# Patient Record
Sex: FEMALE | Race: WHITE | HISPANIC OR LATINO | Employment: OTHER | URBAN - METROPOLITAN AREA
[De-identification: names, ages, dates, MRNs, and addresses within clinical notes are randomized per-mention and may not be internally consistent; named-entity substitution may affect disease eponyms.]

---

## 2020-01-18 ENCOUNTER — APPOINTMENT (EMERGENCY)
Dept: RADIOLOGY | Facility: HOSPITAL | Age: 70
DRG: 190 | End: 2020-01-18
Payer: COMMERCIAL

## 2020-01-18 ENCOUNTER — APPOINTMENT (INPATIENT)
Dept: ULTRASOUND IMAGING | Facility: HOSPITAL | Age: 70
DRG: 190 | End: 2020-01-18
Payer: COMMERCIAL

## 2020-01-18 ENCOUNTER — HOSPITAL ENCOUNTER (INPATIENT)
Facility: HOSPITAL | Age: 70
LOS: 6 days | Discharge: HOME | DRG: 190 | End: 2020-01-24
Attending: EMERGENCY MEDICINE | Admitting: INTERNAL MEDICINE
Payer: COMMERCIAL

## 2020-01-18 ENCOUNTER — APPOINTMENT (INPATIENT)
Dept: CT IMAGING | Facility: HOSPITAL | Age: 70
DRG: 190 | End: 2020-01-18
Payer: COMMERCIAL

## 2020-01-18 DIAGNOSIS — R79.89 ELEVATED LFTS: ICD-10-CM

## 2020-01-18 DIAGNOSIS — I10 HYPERTENSION: ICD-10-CM

## 2020-01-18 DIAGNOSIS — J10.1 INFLUENZA B: ICD-10-CM

## 2020-01-18 DIAGNOSIS — R06.00 DYSPNEA: Primary | ICD-10-CM

## 2020-01-18 DIAGNOSIS — J98.01 BRONCHOSPASM: ICD-10-CM

## 2020-01-18 DIAGNOSIS — J44.1 COPD WITH ACUTE EXACERBATION (HCC): ICD-10-CM

## 2020-01-18 DIAGNOSIS — K76.6 PORTAL HYPERTENSION (HCC): ICD-10-CM

## 2020-01-18 DIAGNOSIS — R53.1 LEFT-SIDED WEAKNESS: ICD-10-CM

## 2020-01-18 DIAGNOSIS — I63.9 CVA (CEREBRAL VASCULAR ACCIDENT) (HCC): ICD-10-CM

## 2020-01-18 DIAGNOSIS — J18.9 PNEUMONIA: ICD-10-CM

## 2020-01-18 PROBLEM — E11.9 DIABETES MELLITUS (HCC): Status: ACTIVE | Noted: 2020-01-18

## 2020-01-18 LAB
ALBUMIN SERPL BCP-MCNC: 3 G/DL (ref 3.5–5)
ALP SERPL-CCNC: 357 U/L (ref 46–116)
ALT SERPL W P-5'-P-CCNC: 98 U/L (ref 12–78)
ANION GAP SERPL CALCULATED.3IONS-SCNC: 10 MMOL/L (ref 4–13)
AST SERPL W P-5'-P-CCNC: 90 U/L (ref 5–45)
BASOPHILS # BLD AUTO: 0.02 THOUSANDS/ΜL (ref 0–0.1)
BASOPHILS NFR BLD AUTO: 0 % (ref 0–1)
BILIRUB SERPL-MCNC: 0.5 MG/DL (ref 0.2–1)
BUN SERPL-MCNC: 11 MG/DL (ref 5–25)
CALCIUM SERPL-MCNC: 8.8 MG/DL (ref 8.3–10.1)
CHLORIDE SERPL-SCNC: 104 MMOL/L (ref 100–108)
CO2 SERPL-SCNC: 26 MMOL/L (ref 21–32)
CREAT SERPL-MCNC: 0.71 MG/DL (ref 0.6–1.3)
D DIMER PPP FEU-MCNC: 0.88 UG/ML FEU
EOSINOPHIL # BLD AUTO: 0.01 THOUSAND/ΜL (ref 0–0.61)
EOSINOPHIL NFR BLD AUTO: 0 % (ref 0–6)
ERYTHROCYTE [DISTWIDTH] IN BLOOD BY AUTOMATED COUNT: 15.4 % (ref 11.6–15.1)
FLUAV RNA NPH QL NAA+PROBE: ABNORMAL
FLUBV RNA NPH QL NAA+PROBE: DETECTED
GFR SERPL CREATININE-BSD FRML MDRD: 87 ML/MIN/1.73SQ M
GLUCOSE SERPL-MCNC: 166 MG/DL (ref 65–140)
GLUCOSE SERPL-MCNC: 198 MG/DL (ref 65–140)
GLUCOSE SERPL-MCNC: 99 MG/DL (ref 65–140)
HCT VFR BLD AUTO: 37.8 % (ref 34.8–46.1)
HGB BLD-MCNC: 12.1 G/DL (ref 11.5–15.4)
IMM GRANULOCYTES # BLD AUTO: 0.03 THOUSAND/UL (ref 0–0.2)
IMM GRANULOCYTES NFR BLD AUTO: 1 % (ref 0–2)
LACTATE SERPL-SCNC: 1.4 MMOL/L (ref 0.5–2)
LYMPHOCYTES # BLD AUTO: 0.83 THOUSANDS/ΜL (ref 0.6–4.47)
LYMPHOCYTES NFR BLD AUTO: 13 % (ref 14–44)
MCH RBC QN AUTO: 23.4 PG (ref 26.8–34.3)
MCHC RBC AUTO-ENTMCNC: 32 G/DL (ref 31.4–37.4)
MCV RBC AUTO: 73 FL (ref 82–98)
MONOCYTES # BLD AUTO: 0.9 THOUSAND/ΜL (ref 0.17–1.22)
MONOCYTES NFR BLD AUTO: 15 % (ref 4–12)
NEUTROPHILS # BLD AUTO: 4.41 THOUSANDS/ΜL (ref 1.85–7.62)
NEUTS SEG NFR BLD AUTO: 71 % (ref 43–75)
NRBC BLD AUTO-RTO: 0 /100 WBCS
PLATELET # BLD AUTO: 220 THOUSANDS/UL (ref 149–390)
PLATELET # BLD AUTO: 258 THOUSANDS/UL (ref 149–390)
PMV BLD AUTO: 10 FL (ref 8.9–12.7)
PMV BLD AUTO: 9.6 FL (ref 8.9–12.7)
POTASSIUM SERPL-SCNC: 3.9 MMOL/L (ref 3.5–5.3)
PROT SERPL-MCNC: 7.2 G/DL (ref 6.4–8.2)
RBC # BLD AUTO: 5.16 MILLION/UL (ref 3.81–5.12)
RSV RNA NPH QL NAA+PROBE: ABNORMAL
SODIUM SERPL-SCNC: 140 MMOL/L (ref 136–145)
TROPONIN I SERPL-MCNC: <0.02 NG/ML
TSH SERPL DL<=0.05 MIU/L-ACNC: 0.42 UIU/ML (ref 0.36–3.74)
WBC # BLD AUTO: 6.2 THOUSAND/UL (ref 4.31–10.16)

## 2020-01-18 PROCEDURE — 85049 AUTOMATED PLATELET COUNT: CPT | Performed by: INTERNAL MEDICINE

## 2020-01-18 PROCEDURE — 87040 BLOOD CULTURE FOR BACTERIA: CPT | Performed by: EMERGENCY MEDICINE

## 2020-01-18 PROCEDURE — 94760 N-INVAS EAR/PLS OXIMETRY 1: CPT

## 2020-01-18 PROCEDURE — 85025 COMPLETE CBC W/AUTO DIFF WBC: CPT | Performed by: EMERGENCY MEDICINE

## 2020-01-18 PROCEDURE — 96361 HYDRATE IV INFUSION ADD-ON: CPT

## 2020-01-18 PROCEDURE — 82948 REAGENT STRIP/BLOOD GLUCOSE: CPT

## 2020-01-18 PROCEDURE — 85379 FIBRIN DEGRADATION QUANT: CPT | Performed by: EMERGENCY MEDICINE

## 2020-01-18 PROCEDURE — 83605 ASSAY OF LACTIC ACID: CPT | Performed by: EMERGENCY MEDICINE

## 2020-01-18 PROCEDURE — 99222 1ST HOSP IP/OBS MODERATE 55: CPT | Performed by: INTERNAL MEDICINE

## 2020-01-18 PROCEDURE — 94644 CONT INHLJ TX 1ST HOUR: CPT

## 2020-01-18 PROCEDURE — 94664 DEMO&/EVAL PT USE INHALER: CPT

## 2020-01-18 PROCEDURE — 36415 COLL VENOUS BLD VENIPUNCTURE: CPT

## 2020-01-18 PROCEDURE — 84145 PROCALCITONIN (PCT): CPT | Performed by: INTERNAL MEDICINE

## 2020-01-18 PROCEDURE — 99285 EMERGENCY DEPT VISIT HI MDM: CPT | Performed by: EMERGENCY MEDICINE

## 2020-01-18 PROCEDURE — 93005 ELECTROCARDIOGRAM TRACING: CPT

## 2020-01-18 PROCEDURE — 96374 THER/PROPH/DIAG INJ IV PUSH: CPT

## 2020-01-18 PROCEDURE — 71275 CT ANGIOGRAPHY CHEST: CPT

## 2020-01-18 PROCEDURE — 84484 ASSAY OF TROPONIN QUANT: CPT | Performed by: EMERGENCY MEDICINE

## 2020-01-18 PROCEDURE — 71046 X-RAY EXAM CHEST 2 VIEWS: CPT

## 2020-01-18 PROCEDURE — 87631 RESP VIRUS 3-5 TARGETS: CPT | Performed by: EMERGENCY MEDICINE

## 2020-01-18 PROCEDURE — 84443 ASSAY THYROID STIM HORMONE: CPT | Performed by: INTERNAL MEDICINE

## 2020-01-18 PROCEDURE — 99285 EMERGENCY DEPT VISIT HI MDM: CPT

## 2020-01-18 PROCEDURE — 80053 COMPREHEN METABOLIC PANEL: CPT | Performed by: EMERGENCY MEDICINE

## 2020-01-18 RX ORDER — ACETAMINOPHEN 325 MG/1
650 TABLET ORAL ONCE
Status: COMPLETED | OUTPATIENT
Start: 2020-01-18 | End: 2020-01-18

## 2020-01-18 RX ORDER — LISINOPRIL 10 MG/1
10 TABLET ORAL DAILY
Status: DISCONTINUED | OUTPATIENT
Start: 2020-01-19 | End: 2020-01-24 | Stop reason: HOSPADM

## 2020-01-18 RX ORDER — PREDNISONE 10 MG/1
40 TABLET ORAL DAILY
COMMUNITY
End: 2020-01-18

## 2020-01-18 RX ORDER — SODIUM CHLORIDE 9 MG/ML
50 INJECTION, SOLUTION INTRAVENOUS CONTINUOUS
Status: DISCONTINUED | OUTPATIENT
Start: 2020-01-18 | End: 2020-01-22

## 2020-01-18 RX ORDER — METHYLPREDNISOLONE SODIUM SUCCINATE 125 MG/2ML
125 INJECTION, POWDER, LYOPHILIZED, FOR SOLUTION INTRAMUSCULAR; INTRAVENOUS ONCE
Status: COMPLETED | OUTPATIENT
Start: 2020-01-18 | End: 2020-01-18

## 2020-01-18 RX ORDER — LACTULOSE 10 G/15ML
15 SOLUTION ORAL 3 TIMES DAILY PRN
COMMUNITY

## 2020-01-18 RX ORDER — ASPIRIN 81 MG/1
81 TABLET, CHEWABLE ORAL DAILY
COMMUNITY

## 2020-01-18 RX ORDER — ASPIRIN 81 MG/1
81 TABLET, CHEWABLE ORAL DAILY
Status: DISCONTINUED | OUTPATIENT
Start: 2020-01-19 | End: 2020-01-22

## 2020-01-18 RX ORDER — FOLIC ACID 1 MG/1
1 TABLET ORAL DAILY
Status: DISCONTINUED | OUTPATIENT
Start: 2020-01-19 | End: 2020-01-24 | Stop reason: HOSPADM

## 2020-01-18 RX ORDER — TEMAZEPAM 7.5 MG/1
7.5 CAPSULE ORAL
Status: DISCONTINUED | OUTPATIENT
Start: 2020-01-18 | End: 2020-01-18

## 2020-01-18 RX ORDER — BENZONATATE 100 MG/1
100 CAPSULE ORAL 3 TIMES DAILY PRN
COMMUNITY

## 2020-01-18 RX ORDER — IPRATROPIUM BROMIDE AND ALBUTEROL SULFATE 2.5; .5 MG/3ML; MG/3ML
3 SOLUTION RESPIRATORY (INHALATION)
Status: DISCONTINUED | OUTPATIENT
Start: 2020-01-18 | End: 2020-01-18

## 2020-01-18 RX ORDER — DOXYCYCLINE HYCLATE 100 MG/1
100 CAPSULE ORAL EVERY 12 HOURS SCHEDULED
Status: DISCONTINUED | OUTPATIENT
Start: 2020-01-18 | End: 2020-01-23

## 2020-01-18 RX ORDER — MONTELUKAST SODIUM 10 MG/1
10 TABLET ORAL DAILY
COMMUNITY

## 2020-01-18 RX ORDER — SODIUM CHLORIDE 9 MG/ML
125 INJECTION, SOLUTION INTRAVENOUS CONTINUOUS
Status: DISCONTINUED | OUTPATIENT
Start: 2020-01-18 | End: 2020-01-19

## 2020-01-18 RX ORDER — MONTELUKAST SODIUM 10 MG/1
10 TABLET ORAL DAILY
Status: DISCONTINUED | OUTPATIENT
Start: 2020-01-19 | End: 2020-01-24 | Stop reason: HOSPADM

## 2020-01-18 RX ORDER — BENZONATATE 100 MG/1
100 CAPSULE ORAL 3 TIMES DAILY PRN
Status: DISCONTINUED | OUTPATIENT
Start: 2020-01-18 | End: 2020-01-24 | Stop reason: HOSPADM

## 2020-01-18 RX ORDER — LISINOPRIL 10 MG/1
10 TABLET ORAL DAILY
Status: ON HOLD | COMMUNITY
End: 2020-01-24 | Stop reason: SDUPTHER

## 2020-01-18 RX ORDER — GUAIFENESIN/DEXTROMETHORPHAN 100-10MG/5
10 SYRUP ORAL EVERY 4 HOURS PRN
Status: DISCONTINUED | OUTPATIENT
Start: 2020-01-18 | End: 2020-01-24 | Stop reason: HOSPADM

## 2020-01-18 RX ORDER — ACETAMINOPHEN 325 MG/1
650 TABLET ORAL EVERY 6 HOURS PRN
Status: DISCONTINUED | OUTPATIENT
Start: 2020-01-18 | End: 2020-01-24 | Stop reason: HOSPADM

## 2020-01-18 RX ORDER — ATORVASTATIN CALCIUM 40 MG/1
40 TABLET, FILM COATED ORAL DAILY
COMMUNITY
End: 2020-01-24 | Stop reason: HOSPADM

## 2020-01-18 RX ORDER — SODIUM CHLORIDE FOR INHALATION 0.9 %
12 VIAL, NEBULIZER (ML) INHALATION ONCE
Status: COMPLETED | OUTPATIENT
Start: 2020-01-18 | End: 2020-01-18

## 2020-01-18 RX ORDER — MULTIVIT-MIN/IRON FUM/FOLIC AC 7.5 MG-4
1 TABLET ORAL DAILY
COMMUNITY

## 2020-01-18 RX ORDER — DOCUSATE SODIUM 100 MG/1
200 CAPSULE, LIQUID FILLED ORAL 2 TIMES DAILY
Status: DISCONTINUED | OUTPATIENT
Start: 2020-01-18 | End: 2020-01-24 | Stop reason: HOSPADM

## 2020-01-18 RX ORDER — ACETAMINOPHEN 325 MG/1
650 TABLET ORAL EVERY 6 HOURS PRN
COMMUNITY

## 2020-01-18 RX ORDER — METHYLPREDNISOLONE SODIUM SUCCINATE 125 MG/2ML
60 INJECTION, POWDER, LYOPHILIZED, FOR SOLUTION INTRAMUSCULAR; INTRAVENOUS EVERY 6 HOURS SCHEDULED
Status: DISCONTINUED | OUTPATIENT
Start: 2020-01-18 | End: 2020-01-22

## 2020-01-18 RX ORDER — DOCUSATE SODIUM 100 MG/1
200 CAPSULE, LIQUID FILLED ORAL 2 TIMES DAILY
COMMUNITY
Start: 2018-03-20

## 2020-01-18 RX ORDER — POLYETHYLENE GLYCOL 3350 17 G/17G
17 POWDER, FOR SOLUTION ORAL 2 TIMES DAILY
COMMUNITY

## 2020-01-18 RX ORDER — PANTOPRAZOLE SODIUM 40 MG/1
40 TABLET, DELAYED RELEASE ORAL
Status: DISCONTINUED | OUTPATIENT
Start: 2020-01-19 | End: 2020-01-24 | Stop reason: HOSPADM

## 2020-01-18 RX ORDER — POTASSIUM CHLORIDE 750 MG/1
10 TABLET, EXTENDED RELEASE ORAL 2 TIMES DAILY
COMMUNITY

## 2020-01-18 RX ORDER — POTASSIUM CHLORIDE 750 MG/1
10 TABLET, EXTENDED RELEASE ORAL 2 TIMES DAILY
Status: DISCONTINUED | OUTPATIENT
Start: 2020-01-18 | End: 2020-01-24 | Stop reason: HOSPADM

## 2020-01-18 RX ORDER — GUAIFENESIN/DEXTROMETHORPHAN 100-10MG/5
10 SYRUP ORAL ONCE
Status: COMPLETED | OUTPATIENT
Start: 2020-01-18 | End: 2020-01-18

## 2020-01-18 RX ORDER — LEVETIRACETAM 500 MG/1
1500 TABLET ORAL 2 TIMES DAILY
Status: DISCONTINUED | OUTPATIENT
Start: 2020-01-18 | End: 2020-01-24 | Stop reason: HOSPADM

## 2020-01-18 RX ORDER — FOLIC ACID 1 MG/1
1 TABLET ORAL DAILY
COMMUNITY

## 2020-01-18 RX ORDER — TORSEMIDE 10 MG/1
10 TABLET ORAL DAILY
COMMUNITY
End: 2020-01-18

## 2020-01-18 RX ORDER — LACTULOSE 20 G/30ML
10 SOLUTION ORAL DAILY
Status: DISCONTINUED | OUTPATIENT
Start: 2020-01-19 | End: 2020-01-24 | Stop reason: HOSPADM

## 2020-01-18 RX ORDER — ATORVASTATIN CALCIUM 40 MG/1
40 TABLET, FILM COATED ORAL DAILY
Status: DISCONTINUED | OUTPATIENT
Start: 2020-01-19 | End: 2020-01-20

## 2020-01-18 RX ORDER — LEVALBUTEROL 1.25 MG/.5ML
1.25 SOLUTION, CONCENTRATE RESPIRATORY (INHALATION)
Status: DISCONTINUED | OUTPATIENT
Start: 2020-01-18 | End: 2020-01-19

## 2020-01-18 RX ORDER — DOXYCYCLINE HYCLATE 100 MG/1
100 CAPSULE ORAL EVERY 12 HOURS SCHEDULED
COMMUNITY
End: 2020-01-24 | Stop reason: HOSPADM

## 2020-01-18 RX ORDER — POLYETHYLENE GLYCOL 3350 17 G/17G
17 POWDER, FOR SOLUTION ORAL 2 TIMES DAILY
Status: DISCONTINUED | OUTPATIENT
Start: 2020-01-18 | End: 2020-01-24 | Stop reason: HOSPADM

## 2020-01-18 RX ORDER — ONDANSETRON 2 MG/ML
4 INJECTION INTRAMUSCULAR; INTRAVENOUS EVERY 6 HOURS PRN
Status: DISCONTINUED | OUTPATIENT
Start: 2020-01-18 | End: 2020-01-19

## 2020-01-18 RX ORDER — LEVETIRACETAM 500 MG/1
1500 TABLET ORAL 2 TIMES DAILY
COMMUNITY

## 2020-01-18 RX ORDER — AZITHROMYCIN 250 MG/1
500 TABLET, FILM COATED ORAL EVERY 24 HOURS
Status: DISCONTINUED | OUTPATIENT
Start: 2020-01-18 | End: 2020-01-22

## 2020-01-18 RX ORDER — ZOLPIDEM TARTRATE 5 MG/1
5 TABLET ORAL
Status: COMPLETED | OUTPATIENT
Start: 2020-01-18 | End: 2020-01-19

## 2020-01-18 RX ORDER — OMEPRAZOLE 20 MG/1
20 CAPSULE, DELAYED RELEASE ORAL EVERY 12 HOURS
COMMUNITY

## 2020-01-18 RX ADMIN — POLYETHYLENE GLYCOL 3350 17 G: 17 POWDER, FOR SOLUTION ORAL at 17:37

## 2020-01-18 RX ADMIN — IPRATROPIUM BROMIDE 0.5 MG: 0.5 SOLUTION RESPIRATORY (INHALATION) at 20:00

## 2020-01-18 RX ADMIN — SODIUM CHLORIDE 1000 ML: 0.9 INJECTION, SOLUTION INTRAVENOUS at 14:41

## 2020-01-18 RX ADMIN — LEVALBUTEROL HYDROCHLORIDE 1.25 MG: 1.25 SOLUTION, CONCENTRATE RESPIRATORY (INHALATION) at 20:00

## 2020-01-18 RX ADMIN — CEFTRIAXONE SODIUM 1000 MG: 10 INJECTION, POWDER, FOR SOLUTION INTRAVENOUS at 18:52

## 2020-01-18 RX ADMIN — INSULIN LISPRO 1 UNITS: 100 INJECTION, SOLUTION INTRAVENOUS; SUBCUTANEOUS at 19:52

## 2020-01-18 RX ADMIN — ALBUTEROL SULFATE 10 MG: 2.5 SOLUTION RESPIRATORY (INHALATION) at 13:18

## 2020-01-18 RX ADMIN — SODIUM CHLORIDE 50 ML/HR: 0.9 INJECTION, SOLUTION INTRAVENOUS at 17:23

## 2020-01-18 RX ADMIN — ZOLPIDEM TARTRATE 5 MG: 5 TABLET, FILM COATED ORAL at 21:43

## 2020-01-18 RX ADMIN — GUAIFENESIN AND DEXTROMETHORPHAN 10 ML: 100; 10 SYRUP ORAL at 15:02

## 2020-01-18 RX ADMIN — LEVETIRACETAM 1500 MG: 500 TABLET, FILM COATED ORAL at 17:37

## 2020-01-18 RX ADMIN — METHYLPREDNISOLONE SODIUM SUCCINATE 60 MG: 125 INJECTION, POWDER, FOR SOLUTION INTRAMUSCULAR; INTRAVENOUS at 17:37

## 2020-01-18 RX ADMIN — IOHEXOL 100 ML: 350 INJECTION, SOLUTION INTRAVENOUS at 15:53

## 2020-01-18 RX ADMIN — METHYLPREDNISOLONE SODIUM SUCCINATE 125 MG: 125 INJECTION, POWDER, FOR SOLUTION INTRAMUSCULAR; INTRAVENOUS at 13:15

## 2020-01-18 RX ADMIN — ISODIUM CHLORIDE 12 ML: 0.03 SOLUTION RESPIRATORY (INHALATION) at 13:18

## 2020-01-18 RX ADMIN — POTASSIUM CHLORIDE 10 MEQ: 750 TABLET, EXTENDED RELEASE ORAL at 17:37

## 2020-01-18 RX ADMIN — BENZONATATE 100 MG: 100 CAPSULE ORAL at 21:45

## 2020-01-18 RX ADMIN — ACETAMINOPHEN 650 MG: 325 TABLET ORAL at 13:09

## 2020-01-18 RX ADMIN — IPRATROPIUM BROMIDE 1 MG: 0.5 SOLUTION RESPIRATORY (INHALATION) at 13:18

## 2020-01-18 RX ADMIN — AZITHROMYCIN 500 MG: 250 TABLET, FILM COATED ORAL at 16:05

## 2020-01-18 RX ADMIN — DOCUSATE SODIUM 200 MG: 100 CAPSULE, LIQUID FILLED ORAL at 17:37

## 2020-01-18 RX ADMIN — ACETAMINOPHEN 650 MG: 325 TABLET ORAL at 21:45

## 2020-01-18 RX ADMIN — SODIUM CHLORIDE 1000 ML: 0.9 INJECTION, SOLUTION INTRAVENOUS at 13:17

## 2020-01-18 RX ADMIN — DOXYCYCLINE 100 MG: 100 CAPSULE ORAL at 21:43

## 2020-01-18 NOTE — ED PROVIDER NOTES
History  Chief Complaint   Patient presents with    Shortness of Breath     Pt presents to ED with co SOB, cough, congestion, sore throat that started 2 days ago  Per family pt has stroke last month  HPI  59-year-old  female with a chief complaint of shortness of breath, cough, congestion and sore throat that started 2 days ago  Patient had a stroke in December in [place] and was in the room with a patient that had the flu and was treated with Tamiflu during rehab  However patient was recently moved up here to her family and her granddaughter had influenza B and 2 days ago patient developed symptoms  Patient has weakness on her left upper and left lower extremity due to her CVA in December  Patient complains of cough and shortness of breath  Patient is also taking prednisone and doxycycline and has not improved  Prior to Admission Medications   Prescriptions Last Dose Informant Patient Reported? Taking?    Multiple Vitamins-Minerals (MULTIVITAMIN WITH MINERALS) tablet 1/17/2020 at Unknown time  Yes Yes   Sig: Take 1 tablet by mouth daily   VITAMIN A PO 1/18/2020 at 0800  Yes Yes   Sig: Take 1 capsule by mouth daily   ZOLPIDEM TARTRATE ER PO 1/17/2020 at Unknown time  Yes Yes   Sig: Take 10 mg by mouth daily at bedtime    acetaminophen (TYLENOL) 325 mg tablet   Yes Yes   Sig: Take 650 mg by mouth every 6 (six) hours as needed for mild pain   aspirin 81 mg chewable tablet 1/18/2020 at 0800  Yes Yes   Sig: Chew 81 mg daily   atorvastatin (LIPITOR) 40 mg tablet 1/17/2020 at Unknown time  Yes Yes   Sig: Take 40 mg by mouth daily   benzonatate (TESSALON PERLES) 100 mg capsule 1/18/2020 at 0800  Yes Yes   Sig: Take 100 mg by mouth 3 (three) times a day as needed for cough   docusate sodium (COLACE) 100 mg capsule 1/17/2020 at Unknown time  Yes Yes   Sig: Take 200 mg by mouth 2 (two) times a day    doxycycline hyclate (VIBRAMYCIN) 100 mg capsule 1/18/2020 at 0800  Yes Yes   Sig: Take 100 mg by mouth every 12 (twelve) hours   folic acid (FOLVITE) 1 mg tablet 1/17/2020 at Unknown time  Yes Yes   Sig: Take 1 mg by mouth daily    guaiFENesin (ROBITUSSIN) 100 MG/5ML oral liquid 1/17/2020 at Unknown time  Yes Yes   Sig: Take 200 mg by mouth every 12 (twelve) hours   lactulose (CHRONULAC) 10 g/15 mL solution Past Week at Unknown time  Yes Yes   Sig: Take 15 mL by mouth 3 (three) times a day as needed   levETIRAcetam (KEPPRA) 500 mg tablet 1/17/2020 at Unknown time  Yes Yes   Sig: Take 1,500 mg by mouth 2 (two) times a day    lisinopril (ZESTRIL) 10 mg tablet 1/17/2020 at Unknown time  Yes Yes   Sig: Take 10 mg by mouth daily   montelukast (SINGULAIR) 10 mg tablet 1/17/2020 at Unknown time  Yes Yes   Sig: Take 10 mg by mouth daily   omeprazole (PriLOSEC) 20 mg delayed release capsule 1/17/2020 at Unknown time  Yes Yes   Sig: Take 20 mg by mouth every 12 (twelve) hours   polyethylene glycol (MIRALAX) 17 g packet 1/17/2020 at Unknown time  Yes Yes   Sig: Take 17 g by mouth 2 (two) times a day   potassium chloride (K-DUR,KLOR-CON) 10 mEq tablet 1/17/2020 at Unknown time  Yes Yes   Sig: Take 10 mEq by mouth 2 (two) times a day       Facility-Administered Medications: None       Past Medical History:   Diagnosis Date    Asthma     Cerebral infarction ([place] [address] )     Diabetes mellitus ([place] [address] )     GERD (gastroesophageal reflux disease)     Hyperlipidemia     Hypertension     Stroke ([place] [address] )     12/19    SVT (supraventricular tachycardia) ([place] [address] )        History reviewed  No pertinent surgical history  History reviewed  No pertinent family history  I have reviewed and agree with the history as documented  Social History     Tobacco Use    Smoking status: Never Smoker    Smokeless tobacco: Never Used   Substance Use Topics    Alcohol use: Never     Frequency: Never    Drug use: Not Currently        Review of Systems   Constitutional: Positive for activity change, appetite change, chills, fatigue and fever     HENT: Positive for sore throat  Negative for congestion and rhinorrhea  Eyes: Negative for discharge and visual disturbance  Respiratory: Positive for cough, shortness of breath and wheezing  Cardiovascular: Negative for chest pain and palpitations  Gastrointestinal: Negative for abdominal pain and vomiting  Endocrine: Negative for polydipsia and polyuria  Genitourinary: Negative for dysuria and hematuria  Musculoskeletal: Positive for arthralgias, gait problem and myalgias  Negative for neck stiffness  Skin: Negative for rash and wound  Neurological: Positive for weakness  Negative for dizziness and headaches  Psychiatric/Behavioral: Negative for confusion and suicidal ideas  Is  Physical Exam  Physical Exam   Constitutional: She is oriented to person, place, and time  She appears well-developed and well-nourished  59-year-old  female lying on the stretcher who appears ill with constant cough  HENT:   Head: Normocephalic and atraumatic  Mouth/Throat: Oropharynx is clear and moist    Eyes: Pupils are equal, round, and reactive to light  EOM are normal    Neck: Normal range of motion  Neck supple  Cardiovascular: Normal rate, regular rhythm and normal heart sounds  Pulmonary/Chest: Effort normal  No stridor  No respiratory distress  She has wheezes  She has no rales  She exhibits no tenderness  Patient has some shortness of breath and a cough that is constant with expiratory wheezing in all lung fields  Abdominal: Soft  Bowel sounds are normal  There is no tenderness  There is no rebound and no guarding  Musculoskeletal: Normal range of motion  Neurological: She is alert and oriented to person, place, and time  No cranial nerve deficit  She exhibits abnormal muscle tone  Coordination abnormal    Patient has left upper extremity and left lower extremity weakness on exam; she can lift her leg slightly and has a brace on her left lower extremity    She also can lift her left arm slightly  Patient has full range of motion of her right upper and lower extremity  Skin: Skin is warm and dry  There is pallor  Psychiatric: She has a normal mood and affect  Nursing note and vitals reviewed        Vital Signs  ED Triage Vitals [01/18/20 1153]   Temperature Pulse Respirations Blood Pressure SpO2   100 1 °F (37 8 °C) (!) 120 20 (!) 210/128 95 %      Temp Source Heart Rate Source Patient Position - Orthostatic VS BP Location FiO2 (%)   Oral Monitor Sitting Right arm --      Pain Score       Worst Possible Pain           Vitals:    01/18/20 1530 01/18/20 1638 01/18/20 1639 01/18/20 1738   BP: 121/90  136/64 123/62   Pulse: 103 100  95   Patient Position - Orthostatic VS: Lying Lying  Lying         Visual Acuity      ED Medications  Medications   sodium chloride 0 9 % infusion (125 mL/hr Intravenous Not Given 1/18/20 1747)   docusate sodium (COLACE) capsule 200 mg (200 mg Oral Given 1/18/20 1737)   aspirin chewable tablet 81 mg (has no administration in time range)   levETIRAcetam (KEPPRA) tablet 1,500 mg (1,500 mg Oral Given 3/56/77 3344)   folic acid (FOLVITE) tablet 1 mg (has no administration in time range)   potassium chloride (K-DUR,KLOR-CON) CR tablet 10 mEq (10 mEq Oral Given 1/18/20 1737)   atorvastatin (LIPITOR) tablet 40 mg (has no administration in time range)   montelukast (SINGULAIR) tablet 10 mg (has no administration in time range)   polyethylene glycol (MIRALAX) packet 17 g (17 g Oral Given 1/18/20 1737)   pantoprazole (PROTONIX) EC tablet 40 mg (has no administration in time range)   guaiFENesin (ROBITUSSIN) oral liquid 200 mg (200 mg Oral Not Given 1/18/20 1725)   lisinopril (ZESTRIL) tablet 10 mg (has no administration in time range)   acetaminophen (TYLENOL) tablet 650 mg (has no administration in time range)   benzonatate (TESSALON PERLES) capsule 100 mg (has no administration in time range)   doxycycline hyclate (VIBRAMYCIN) capsule 100 mg (has no administration in time range)   lactulose 20 g/30 mL oral solution 10 g (has no administration in time range)   enoxaparin (LOVENOX) subcutaneous injection 40 mg (has no administration in time range)   ondansetron (ZOFRAN) injection 4 mg (has no administration in time range)   sodium chloride 0 9 % infusion (50 mL/hr Intravenous New Bag 1/18/20 1723)   acetaminophen (TYLENOL) tablet 650 mg (has no administration in time range)   dextromethorphan-guaiFENesin (ROBITUSSIN DM)  mg/5 mL oral syrup 10 mL (has no administration in time range)   methylPREDNISolone sodium succinate (Solu-MEDROL) injection 60 mg (60 mg Intravenous Given 1/18/20 1737)   azithromycin (ZITHROMAX) tablet 500 mg (500 mg Oral Given 1/18/20 1605)   insulin lispro (HumaLOG) 100 units/mL subcutaneous injection 1-5 Units (1 Units Subcutaneous Given 1/18/20 1952)   cefTRIAXone (ROCEPHIN) 1,000 mg in dextrose 5 % 50 mL IVPB (1,000 mg Intravenous New Bag 1/18/20 1852)   zolpidem (AMBIEN) tablet 5 mg (has no administration in time range)   levalbuterol (XOPENEX) inhalation solution 1 25 mg (1 25 mg Nebulization Given 1/18/20 2000)   ipratropium (ATROVENT) 0 02 % inhalation solution 0 5 mg (0 5 mg Nebulization Given 1/18/20 2000)   acetaminophen (TYLENOL) tablet 650 mg (650 mg Oral Given 1/18/20 1309)   sodium chloride 0 9 % bolus 1,000 mL (0 mL Intravenous Stopped 1/18/20 1440)   albuterol inhalation solution 10 mg (10 mg Nebulization Given 1/18/20 1318)   ipratropium (ATROVENT) 0 02 % inhalation solution 1 mg (1 mg Nebulization Given 1/18/20 1318)   sodium chloride 0 9 % inhalation solution 12 mL (12 mL Nebulization Given 1/18/20 1318)   methylPREDNISolone sodium succinate (Solu-MEDROL) injection 125 mg (125 mg Intravenous Given 1/18/20 1315)   sodium chloride 0 9 % bolus 1,000 mL (0 mL Intravenous Stopped 1/18/20 1600)   dextromethorphan-guaiFENesin (ROBITUSSIN DM)  mg/5 mL oral syrup 10 mL (10 mL Oral Given 1/18/20 5406)   iohexol (OMNIPAQUE) 350 MG/ML injection (MULTI-DOSE) 100 mL (100 mL Intravenous Given 1/18/20 9110)       Diagnostic Studies  Results Reviewed     Procedure Component Value Units Date/Time    Hemoglobin A1c w/EAG Estimation (Orders if not completed within the last 90 days) [[id number]]     Lab Status:  No result Specimen:  Blood     D-dimer, quantitative [[id number]]  (Abnormal) Collected:  01/18/20 1314    Lab Status:  Final result Specimen:  Blood from Arm, Right Updated:  01/18/20 1443     D-Dimer, Quant 0 88 ug/ml FEU     Influenza A/B and RSV PCR [[id number]]  (Abnormal) Collected:  01/18/20 1216    Lab Status:  Final result Specimen:  Nasopharyngeal Swab Updated:  01/18/20 1326     INFLUENZA A PCR None Detected     INFLUENZA B PCR Detected     RSV PCR None Detected    Lactic acid x2 [[id number]]  (Normal) Collected:  01/18/20 1241    Lab Status:  Final result Specimen:  Blood from Arm, Right Updated:  01/18/20 1323     LACTIC ACID 1 4 mmol/L     Narrative:       Result may be elevated if tourniquet was used during collection      Troponin I [[id number]]  (Normal) Collected:  01/18/20 1241    Lab Status:  Final result Specimen:  Blood from Arm, Right Updated:  01/18/20 1323     Troponin I <0 02 ng/mL     Comprehensive metabolic panel [[id number]]  (Abnormal) Collected:  01/18/20 1241    Lab Status:  Final result Specimen:  Blood from Arm, Right Updated:  01/18/20 1321     Sodium 140 mmol/L      Potassium 3 9 mmol/L      Chloride 104 mmol/L      CO2 26 mmol/L      ANION GAP 10 mmol/L      BUN 11 mg/dL      Creatinine 0 71 mg/dL      Glucose 99 mg/dL      Calcium 8 8 mg/dL      AST 90 U/L      ALT 98 U/L      Alkaline Phosphatase 357 U/L      Total Protein 7 2 g/dL      Albumin 3 0 g/dL      Total Bilirubin 0 50 mg/dL      eGFR 87 ml/min/1 73sq m     Narrative:       [place] guidelines for Chronic Kidney Disease (CKD):     Stage 1 with normal or high GFR (GFR > 90 mL/min/1 73 square meters)    Stage 2 Mild CKD (GFR = 60-89 mL/min/1 73 square meters)    Stage 3A Moderate CKD (GFR = 45-59 mL/min/1 73 square meters)    Stage 3B Moderate CKD (GFR = 30-44 mL/min/1 73 square meters)    Stage 4 Severe CKD (GFR = 15-29 mL/min/1 73 square meters)    Stage 5 End Stage CKD (GFR <15 mL/min/1 73 square meters)  Note: GFR calculation is accurate only with a steady state creatinine    Blood culture #1 [[id number]] Collected:  01/18/20 1257    Lab Status: In process Specimen:  Blood from Line, Venous Updated:  01/18/20 1316    CBC and differential [[id number]]  (Abnormal) Collected:  01/18/20 1241    Lab Status:  Final result Specimen:  Blood from Arm, Right Updated:  01/18/20 1304     WBC 6 20 Thousand/uL      RBC 5 16 Million/uL      Hemoglobin 12 1 g/dL      Hematocrit 37 8 %      MCV 73 fL      MCH 23 4 pg      MCHC 32 0 g/dL      RDW 15 4 %      MPV 10 0 fL      Platelets 208 Thousands/uL      nRBC 0 /100 WBCs      Neutrophils Relative 71 %      Immat GRANS % 1 %      Lymphocytes Relative 13 %      Monocytes Relative 15 %      Eosinophils Relative 0 %      Basophils Relative 0 %      Neutrophils Absolute 4 41 Thousands/µL      Immature Grans Absolute 0 03 Thousand/uL      Lymphocytes Absolute 0 83 Thousands/µL      Monocytes Absolute 0 90 Thousand/µL      Eosinophils Absolute 0 01 Thousand/µL      Basophils Absolute 0 02 Thousands/µL     Blood culture #2 [[id number]] Collected:  01/18/20 1241    Lab Status: In process Specimen:  Blood from Arm, Right Updated:  01/18/20 1300                 CTA ED chest PE Study   Final Result by Dionicio Huber MD (01/18 1616)      No pulmonary embolus  Patchy consolidation in the dependent portion of the right upper lobe compatible with pneumonia  The dependent distribution raises the possibility of aspiration  The study was marked in [facility] for immediate notification                    Workstation performed: [id number]         XR chest 2 views   Final Result by Dionicio Huber MD (01/18 1618)      Right upper lobe pneumonia on subsequent CT is not conspicuous on radiograph  Workstation performed: Luis Chi right upper quadrant with liver dopplers    (Results Pending)              Procedures  ECG 12 Lead Documentation Only  Date/Time: 1/18/2020 1:08 PM  Performed by: Min Small DO  Authorized by: Min Small DO     ECG reviewed by me, the ED Provider: yes    Patient location:  ED  Interpretation:     Interpretation: normal    Rate:     ECG rate assessment: normal    Rhythm:     Rhythm: sinus rhythm    ST segments:     ST segments:  Normal             ED Course      patient looked a little better after her IV fluids, steroids and nebulizers, however she still looked pretty rather ill  I ordered a CT scan which showed a possible right upper lobe aspiration pneumonia  Identification of Seniors at Risk      Most Recent Value   (ISAR) Identification of Seniors at Risk   Before the illness or injury that brought you to the Emergency, did you need someone to help you on a regular basis? 0 Filed at: 01/18/2020 1159   In the last 24 hours, have you needed more help than usual?  0 Filed at: 01/18/2020 1159   Have you been hospitalized for one or more nights during the past 6 months? 1 Filed at: 01/18/2020 1159   In general, do you see well? 1 Filed at: 01/18/2020 1159   In general, do you have serious problems with your memory? 0 Filed at: 01/18/2020 1159   Do you take more than three different medications every day? 1 Filed at: 01/18/2020 1159   ISAR Score  3 Filed at: 01/18/2020 1159                          [facility]   Differential diagnosis includes:  1  Cough  2  Fever  3  Rule out pneumonia  4   Rule out influenza        Disposition  Final diagnoses:   Dyspnea   Influenza B   Bronchospasm   CVA (cerebral vascular accident) ([facility] [address] ) - in December   Left-sided weakness   Pneumonia - RUL/possible aspiration     Time reflects when diagnosis was documented in both MDM as applicable and the Disposition within this note     Time User Action Codes Description Comment    1/18/2020  2:40 PM David  Add [R06 00] Dyspnea     1/18/2020  2:42 PM Ahmet CANCINO Add [J10 1] Influenza B     1/18/2020  2:42 PM David  Add [J98 01] Bronchospasm     1/18/2020  2:44 PM David  Add [I63 9] CVA (cerebral vascular accident) ([address] )     1/18/2020  2:44 PM David  Modify [I63 9] CVA (cerebral vascular accident) [facility]) in December 1/18/2020  2:44 PM Ahmet CANCINO Add [R53 1] Left-sided weakness     1/18/2020  8:50 PM David  Add [J18 9] Pneumonia     1/18/2020  8:50 PM David  Modify [J18 9] Pneumonia RUL    1/18/2020  8:50 PM David  Modify [J18 9] Pneumonia RUL/possible aspiration      ED Disposition     ED Disposition Condition Date/Time Comment    Admit Stable Sat Jan 18, 2020  2:40 PM Case was discussed with Dr Janet Langley and the patient's admission status was agreed to be Admission Status: inpatient status to the service of Dr Janet Langley           Follow-up Information    None         Current Discharge Medication List      CONTINUE these medications which have NOT CHANGED    Details   acetaminophen (TYLENOL) 325 mg tablet Take 650 mg by mouth every 6 (six) hours as needed for mild pain      aspirin 81 mg chewable tablet Chew 81 mg daily      atorvastatin (LIPITOR) 40 mg tablet Take 40 mg by mouth daily      benzonatate (TESSALON PERLES) 100 mg capsule Take 100 mg by mouth 3 (three) times a day as needed for cough      docusate sodium (COLACE) 100 mg capsule Take 200 mg by mouth 2 (two) times a day       doxycycline hyclate (VIBRAMYCIN) 100 mg capsule Take 100 mg by mouth every 12 (twelve) hours      folic acid (FOLVITE) 1 mg tablet Take 1 mg by mouth daily       guaiFENesin (ROBITUSSIN) 100 MG/5ML oral liquid Take 200 mg by mouth every 12 (twelve) hours      lactulose (CHRONULAC) 10 g/15 mL solution Take 15 mL by mouth 3 (three) times a day as needed levETIRAcetam (KEPPRA) 500 mg tablet Take 1,500 mg by mouth 2 (two) times a day       lisinopril (ZESTRIL) 10 mg tablet Take 10 mg by mouth daily      montelukast (SINGULAIR) 10 mg tablet Take 10 mg by mouth daily      Multiple Vitamins-Minerals (MULTIVITAMIN WITH MINERALS) tablet Take 1 tablet by mouth daily      omeprazole (PriLOSEC) 20 mg delayed release capsule Take 20 mg by mouth every 12 (twelve) hours      polyethylene glycol (MIRALAX) 17 g packet Take 17 g by mouth 2 (two) times a day      potassium chloride (K-DUR,KLOR-CON) 10 mEq tablet Take 10 mEq by mouth 2 (two) times a day       VITAMIN A PO Take 1 capsule by mouth daily      ZOLPIDEM TARTRATE ER PO Take 10 mg by mouth daily at bedtime            No discharge procedures on file      ED Provider  Electronically Signed by           Albert Mcgowan DO  01/18/20 2054

## 2020-01-18 NOTE — RESPIRATORY THERAPY NOTE
RT Protocol Note  Jony Gallardo 71 y o  female MRN: [id number]  Unit/Bed#: -01 Encounter: [id number]    Assessment    Principal Problem:    COPD exacerbation ([place] [address] )  Active Problems:    CVA (cerebral vascular accident) ([place] [address] )    Hypertension    Influenza A    Diabetes mellitus ([place] [address] )    Elevated LFTs      Home Pulmonary Medications:  None       Past Medical History:   Diagnosis Date    Asthma     Cerebral infarction ([place] [address] )     Diabetes mellitus ([place] [address] )     GERD (gastroesophageal reflux disease)     Hyperlipidemia     Hypertension     Stroke ([place] [address] )     12/19    [facility]      Social History     Socioeconomic History    Marital status:      Spouse name: None    Number of children: None    Years of education: None    Highest education level: None   Occupational History    None   Social Needs    Financial resource strain: None    Food insecurity:     Worry: None     Inability: None    Transportation needs:     Medical: None     Non-medical: None   Tobacco Use    Smoking status: Never Smoker    Smokeless tobacco: Never Used   Substance and Sexual Activity    Alcohol use: Never     Frequency: Never    Drug use: Not Currently    Sexual activity: Not Currently   Lifestyle    Physical activity:     Days per week: None     Minutes per session: None    Stress: None   Relationships    Social connections:     Talks on phone: None     Gets together: None     Attends [employer] service: None     Active member of club or organization: None     Attends meetings of clubs or organizations: None     Relationship status: None    Intimate partner violence:     Fear of current or ex partner: None     Emotionally abused: None     Physically abused: None     Forced sexual activity: None   Other Topics Concern    None   Social History Narrative    None       Subjective         Objective    Physical Exam:   Assessment Type: During-treatment  General Appearance: Alert, Awake  Respiratory Pattern: Dyspnea with exertion, Dyspnea at rest, Symmetrical, Spontaneous  Chest Assessment: Chest expansion symmetrical, Trachea midline  Bilateral Breath Sounds: Expiratory wheezes, Coarse  Cough: Non-productive  O2 Device: Tx at this time  Vitals:  Blood pressure 123/62, pulse 95, temperature 98 4 °F (36 9 °C), resp  rate 18, height 5' 5" (1 651 m), weight 76 7 kg (169 lb 1 5 oz), SpO2 96 %  Imaging and other studies: I have personally reviewed pertinent reports  O2 Device: Tx at this time  Plan    Respiratory Plan: Mild Distress pathway        Resp Comments: pt given hour long tx in ED at this time  pt has some SOB but not in distress at this time  On room air   Resp protocol eval

## 2020-01-18 NOTE — PLAN OF CARE
Problem: Potential for Falls  Goal: Patient will remain free of falls  Description  INTERVENTIONS:  - Assess patient frequently for physical needs  -  Identify cognitive and physical deficits and behaviors that affect risk of falls    -  [place] fall precautions as indicated by assessment   - Educate patient/family on patient safety including physical limitations  - Instruct patient to call for assistance with activity based on assessment  - Modify environment to reduce risk of injury  - Consider OT/PT consult to assist with strengthening/mobility  1/18/2020 1713 by Lucilla Cooks, RN  Outcome: Progressing  1/18/2020 1712 by Lucilla Cooks, RN  Outcome: Progressing     Problem: PAIN - ADULT  Goal: Verbalizes/displays adequate comfort level or baseline comfort level  Description  Interventions:  - Encourage patient to monitor pain and request assistance  - Assess pain using appropriate pain scale  - Administer analgesics based on type and severity of pain and evaluate response  - Implement non-pharmacological measures as appropriate and evaluate response  - Consider cultural and social influences on pain and pain management  - Notify physician/advanced practitioner if interventions unsuccessful or patient reports new pain  1/18/2020 1713 by Lucilla Cooks, RN  Outcome: Progressing  1/18/2020 1712 by Lucilla Cooks, RN  Outcome: Progressing     Problem: INFECTION - ADULT  Goal: Absence or prevention of progression during hospitalization  Description  INTERVENTIONS:  - Assess and monitor for signs and symptoms of infection  - Monitor lab/diagnostic results  - Monitor all insertion sites, i e  indwelling lines, tubes, and drains  - Monitor endotracheal if appropriate and nasal secretions for changes in amount and color  - [place] appropriate cooling/warming therapies per order  - Administer medications as ordered  - Instruct and encourage patient and family to use good hand hygiene technique  - Identify and instruct in appropriate isolation precautions for identified infection/condition  1/18/2020 1713 by Conception Pod RN  Outcome: Progressing  1/18/2020 1712 by Conception Pod, RN  Outcome: Progressing  Goal: Absence of fever/infection during neutropenic period  Description  INTERVENTIONS:  - Monitor WBC    1/18/2020 1713 by Conception Pod, RN  Outcome: Progressing  1/18/2020 1712 by Conception Pod, RN  Outcome: Progressing     Problem: SAFETY ADULT  Goal: Maintain or return to baseline ADL function  Description  INTERVENTIONS:  -  Assess patient's ability to carry out ADLs; assess patient's baseline for ADL function and identify physical deficits which impact ability to perform ADLs (bathing, care of mouth/teeth, toileting, grooming, dressing, etc )  - Assess/evaluate cause of self-care deficits   - Assess range of motion  - Assess patient's mobility; develop plan if impaired  - Assess patient's need for assistive devices and provide as appropriate  - Encourage maximum independence but intervene and supervise when necessary  - Involve family in performance of ADLs  - Assess for home care needs following discharge   - Consider OT consult to assist with ADL evaluation and planning for discharge  - Provide patient education as appropriate  1/18/2020 1713 by Conception Pod RN  Outcome: Progressing  1/18/2020 1712 by Conception Pod RN  Outcome: Progressing  Goal: Maintain or return mobility status to optimal level  Description  INTERVENTIONS:  - Assess patient's baseline mobility status (ambulation, transfers, stairs, etc )    - Identify cognitive and physical deficits and behaviors that affect mobility  - Identify mobility aids required to assist with transfers and/or ambulation (gait belt, sit-to-stand, lift, walker, cane, etc )  - [place] fall precautions as indicated by assessment  - Record patient progress and toleration of activity level on Mobility SBAR; progress patient to next Phase/Stage  - Instruct patient to call for assistance with activity based on assessment  - Consider rehabilitation consult to assist with strengthening/weightbearing, etc   1/18/2020 1713 by Panda Eid RN  Outcome: Progressing  1/18/2020 1712 by Panda Eid RN  Outcome: Progressing     Problem: DISCHARGE PLANNING  Goal: Discharge to home or other facility with appropriate resources  Description  INTERVENTIONS:  - Identify barriers to discharge w/patient and caregiver  - Arrange for needed discharge resources and transportation as appropriate  - Identify discharge learning needs (meds, wound care, etc )  - Arrange for interpretive services to assist at discharge as needed  - Refer to Case Management Department for coordinating discharge planning if the patient needs post-hospital services based on physician/advanced practitioner order or complex needs related to functional status, cognitive ability, or social support system  1/18/2020 1713 by Panda Eid RN  Outcome: Progressing  1/18/2020 1712 by Panda Eid RN  Outcome: Progressing     Problem: Knowledge Deficit  Goal: Patient/family/caregiver demonstrates understanding of disease process, treatment plan, medications, and discharge instructions  Description  Complete learning assessment and assess knowledge base    Interventions:  - Provide teaching at level of understanding  - Provide teaching via preferred learning methods  1/18/2020 1713 by Panda Eid RN  Outcome: Progressing  1/18/2020 1712 by Panda Eid RN  Outcome: Progressing

## 2020-01-18 NOTE — ED NOTES
1  CC - cough/flu-like symptoms  2  Admission related to injury? - no  3  Orientation status - a/o x's 3  4  Abnormal labs/abnormal focused assessment/vitals - s/p CVA, left-sided deficits; + Influenza B  5  Medication/drips - NSS 1000 mL in right FA  6  Last time narcotics given - n/a  7  IV lines/drains/etc - 20 gauge right FA & right chest port-a-cath  8  Isolation status - DROPLET  9  Skin - intact  10  Ambulation -?assist x's 1   11  ED nurse's name and phone number - Dru Berman ext  [phone] [facility], RN  14  01/18/20 4801  15  PATIENT TAKES MEDICATIONS WITH APPLE SAUCE    CANNOT USE STRAWS FOR DRINKING     Ashton Clipper, RN  01/18/20 2557

## 2020-01-18 NOTE — H&P
H&P- Maximo [facility] 1950, 71 y o  female MRN: [id number]    Unit/Bed#: ED 08 Encounter: [id number]    Primary Care Provider: No primary care provider on file  Date and time admitted to hospital: 1/18/2020 12:00 PM        * COPD exacerbation (HCC)  Assessment & Plan  Known history of COPD  Diagnosed by physical exam excessive wheezing/primarily expiratory  IV Solu-Medrol   Respiratory protocol  Supplemental oxygenation only if oxygen drops to less than 88%  [place]  Early ambulation  Follow-up on CTA chest resolved    Influenza A  Assessment & Plan  Will initiate time of fluid  Reports approximately 10 days ago she was exposed to influenza and was treated with Tamiflu  No diagnosis was done at that time  Given positive serology current symptoms will re-initiate Tamiflu therapy  Hypertension  Assessment & Plan  Continue home medication  BP normotensive currently    CVA (cerebral vascular accident) [facility])  Assessment & Plan  Diagnosed with large ischemic CVA December 17  Underwent rehab with large residual on the left side complete hemiparesis  Continue PT/OT therapy  High-intensity statin therapy continue  Continue adequate BP control          VTE Prophylaxis: Enoxaparin (Lovenox)  / sequential compression device   Code Status:  Full code  POLST: There is no POLST form on file for this patient (pre-hospital)    Anticipated Length of Stay:  Patient will be admitted on an Inpatient basis with an anticipated length of stay of  greater 2 midnights  Justification for Hospital Stay:  COPD exacerbation    Total Time for Visit, including Counseling / Coordination of Care: 30 minutes  Greater than 50% of this total time spent on direct patient counseling and coordination of care      Chief Complaint:   Cough not feeling well x3 days     History of Present Illness:  Very pleasant 51-year-old woman with past medical history of asthma/emphysema, hypertension, hyperlipidemia, diabetes mellitus recent CVA with left hemiparesis diagnosis in December 2019 presenting to us with chief complaint of ongoing cough runny nose generalized malaise and fatigue  She reports symptoms started approximately 3 days ago  Reports 1 episode of fever but was not able to measure temperature failed or denies chest pain denies syncope denies shortness of breath  Chest x-ray was unremarkable emergency department however patient was found to be in mild respiratory distress with continued cough and shortness of breath and was unable to give appropriate answers during interview without getting short of breath and coughing  Other than that denies fever chills denies changes in bowel or urinary habits  Pertinent positives included wheezing throughout all lung fields tachypnea positive D-dimer CTA chest pending, unremarkable chest x-ray  Review of Systems:    Review of Systems    REVIEW OF SYSTEMS:   CONSTITUTIONAL: No weight loss, fever, chills, weakness or fatigue  HEENT: Eyes: No visual loss, blurred vision, double vision or yellow sclerae  Ears, Nose, Throat: No hearing loss, sneezing, congestion, runny nose or sore throat  SKIN: No rash or itching  CARDIOVASCULAR: No chest pain, chest pressure or chest discomfort  No palpitations or edema  RESPIRATORY:  Endorses exertional shortness of breath, cough occasional sputum  GASTROINTESTINAL: No anorexia, nausea, vomiting or diarrhea  No abdominal pain or blood  GENITOURINARY:  No Burning on urination  No issues reported    NEUROLOGICAL: No headache, dizziness, syncope, paralysis, ataxia, numbness or tingling in the extremities  No change in bowel or bladder control  MUSCULOSKELETAL: No muscle, back pain, joint pain or stiffness  HEMATOLOGIC: No anemia, bleeding or bruising  LYMPHATICS: No enlarged nodes  No history of splenectomy  PSYCHIATRIC: No history of depression or anxiety  ENDOCRINOLOGIC: No reports of sweating, cold or heat intolerance   No polyuria or polydipsia  ALLERGIES: No history of asthma, hives, eczema or rhinitis  Past Medical and Surgical History:     Past Medical History:   Diagnosis Date    Asthma     Cerebral infarction ([place] [address] )     Diabetes mellitus ([place] [address] )     GERD (gastroesophageal reflux disease)     Hyperlipidemia     Hypertension     Stroke ([place] [address] )     12/19    SVT (supraventricular tachycardia) ([place] [address] )        History reviewed  No pertinent surgical history  Meds/Allergies:    Prior to Admission medications    Medication Sig Start Date End Date Taking?  Authorizing Provider   acetaminophen (TYLENOL) 325 mg tablet Take 650 mg by mouth every 6 (six) hours as needed for mild pain   Yes Historical Provider, MD   aspirin 81 mg chewable tablet Chew 81 mg daily   Yes Historical Provider, MD   atorvastatin (LIPITOR) 40 mg tablet Take 40 mg by mouth daily   Yes Historical Provider, MD   benzonatate (TESSALON PERLES) 100 mg capsule Take 100 mg by mouth 3 (three) times a day as needed for cough   Yes Historical Provider, MD   docusate sodium (COLACE) 100 mg capsule Take 200 mg by mouth 2 (two) times a day  3/20/18  Yes Historical Provider, MD   doxycycline hyclate (VIBRAMYCIN) 100 mg capsule Take 100 mg by mouth every 12 (twelve) hours   Yes Historical Provider, MD   folic acid (FOLVITE) 1 mg tablet Take 1 mg by mouth daily    Yes Historical Provider, MD   guaiFENesin (ROBITUSSIN) 100 MG/5ML oral liquid Take 200 mg by mouth every 12 (twelve) hours   Yes Historical Provider, MD   lactulose (CHRONULAC) 10 g/15 mL solution Take 15 mL by mouth 3 (three) times a day as needed   Yes Historical Provider, MD   levETIRAcetam (KEPPRA) 500 mg tablet Take 1,500 mg by mouth 2 (two) times a day    Yes Historical Provider, MD   lisinopril (ZESTRIL) 10 mg tablet Take 10 mg by mouth daily   Yes Historical Provider, MD   montelukast (SINGULAIR) 10 mg tablet Take 10 mg by mouth daily   Yes Historical Provider, MD   Multiple Vitamins-Minerals (MULTIVITAMIN WITH MINERALS) tablet Take 1 tablet by mouth daily   Yes Historical Provider, MD   omeprazole (PriLOSEC) 20 mg delayed release capsule Take 20 mg by mouth every 12 (twelve) hours   Yes Historical Provider, MD   polyethylene glycol (MIRALAX) 17 g packet Take 17 g by mouth 2 (two) times a day   Yes Historical Provider, MD   potassium chloride (K-DUR,KLOR-CON) 10 mEq tablet Take 10 mEq by mouth 2 (two) times a day    Yes Historical Provider, MD   VITAMIN A PO Take 1 capsule by mouth daily   Yes Historical Provider, MD   ZOLPIDEM TARTRATE ER PO Take 10 mg by mouth daily at bedtime    Yes Historical Provider, MD   predniSONE 10 mg tablet Take 40 mg by mouth daily  1/18/20 Yes Historical Provider, MD   torsemide (DEMADEX) 10 mg tablet Take 10 mg by mouth daily  1/18/20 Yes Historical Provider, MD     I have reviewed home medications with patient personally  Allergies: Allergies   Allergen Reactions    Ciprofloxacin Rash    Gabapentin Rash    Levaquin [Levofloxacin] Rash    Sulfa Antibiotics Rash       Social History:     Marital Status:      Social History     Substance and Sexual Activity   Alcohol Use Not Currently     Social History     Tobacco Use   Smoking Status Never Smoker   Smokeless Tobacco Never Used     Social History     Substance and Sexual Activity   Drug Use Not Currently       Family History:    non-contributory    Physical Exam:     Vitals:   Blood Pressure: 156/77 (01/18/20 1430)  Pulse: (!) 110 (01/18/20 1430)  Temperature: 98 4 °F (36 9 °C) (01/18/20 1500)  Temp Source: Oral (01/18/20 1500)  Respirations: 20 (01/18/20 1430)  Height: 5' 5" (165 1 cm) (01/18/20 1153)  Weight - Scale: 76 7 kg (169 lb 1 5 oz) (01/18/20 1153)  SpO2: 100 % (01/18/20 1430)    Physical Exam      Constitutional:  Well developed, well nourished, no acute distress, non-toxic appearance   Eyes:  PERRL, conjunctiva normal   HENT:  Atraumatic, external ears normal, nose normal, oropharynx moist, no pharyngeal exudates  Neck- normal range of motion, no tenderness, supple   Respiratory:  Prolonged expiratory phase, rales in bilateral lower lobes, wheezing throughout all lung fields primarily expiratory wheezing  Cardiovascular:  Normal rate, normal rhythm, no murmurs, no gallops, no rubs   GI:  Soft, nondistended, normal bowel sounds, nontender, no organomegaly, no mass, no rebound, no guarding   :  No costovertebral angle tenderness   Musculoskeletal:  No edema, no tenderness, no deformities  Back- no tenderness  Integument:  Well hydrated, no rash   Lymphatic:  No lymphadenopathy noted   Neurologic:  Established residual weakness with left hemiparesis however that Alert & oriented x 3, CN 2-12 normal, normal motor function, normal sensory function, no focal deficits noted   Psychiatric:  Speech and behavior appropriate               Additional Data:     Lab Results: I have personally reviewed pertinent reports  Results from last 7 days   Lab Units 01/18/20  1241   WBC Thousand/uL 6 20   HEMOGLOBIN g/dL 12 1   HEMATOCRIT % 37 8   PLATELETS Thousands/uL 258   NEUTROS PCT % 71   LYMPHS PCT % 13*   MONOS PCT % 15*   EOS PCT % 0     Results from last 7 days   Lab Units 01/18/20  1241   POTASSIUM mmol/L 3 9   CHLORIDE mmol/L 104   CO2 mmol/L 26   BUN mg/dL 11   CREATININE mg/dL 0 71   CALCIUM mg/dL 8 8   ALK PHOS U/L 357*   ALT U/L 98*   AST U/L 90*           Imaging: I have personally reviewed pertinent reports  No results found  EKG, Pathology, and Other Studies Reviewed on Admission:   · EKG:  Noncontributory no ST elevation or T-wave inversion contiguous leads  Epic / Care Everywhere Records Reviewed: No     ** Please Note: This note has been constructed using a voice recognition system   **

## 2020-01-18 NOTE — PLAN OF CARE
Problem: Potential for Falls  Goal: Patient will remain free of falls  Description  INTERVENTIONS:  - Assess patient frequently for physical needs  -  Identify cognitive and physical deficits and behaviors that affect risk of falls    -  [place] fall precautions as indicated by assessment   - Educate patient/family on patient safety including physical limitations  - Instruct patient to call for assistance with activity based on assessment  - Modify environment to reduce risk of injury  - Consider OT/PT consult to assist with strengthening/mobility  Outcome: Progressing     Problem: PAIN - ADULT  Goal: Verbalizes/displays adequate comfort level or baseline comfort level  Description  Interventions:  - Encourage patient to monitor pain and request assistance  - Assess pain using appropriate pain scale  - Administer analgesics based on type and severity of pain and evaluate response  - Implement non-pharmacological measures as appropriate and evaluate response  - Consider cultural and social influences on pain and pain management  - Notify physician/advanced practitioner if interventions unsuccessful or patient reports new pain  Outcome: Progressing     Problem: INFECTION - ADULT  Goal: Absence or prevention of progression during hospitalization  Description  INTERVENTIONS:  - Assess and monitor for signs and symptoms of infection  - Monitor lab/diagnostic results  - Monitor all insertion sites, i e  indwelling lines, tubes, and drains  - Monitor endotracheal if appropriate and nasal secretions for changes in amount and color  - [place] appropriate cooling/warming therapies per order  - Administer medications as ordered  - Instruct and encourage patient and family to use good hand hygiene technique  - Identify and instruct in appropriate isolation precautions for identified infection/condition  Outcome: Progressing  Goal: Absence of fever/infection during neutropenic period  Description  INTERVENTIONS:  - Monitor WBC    Outcome: Progressing     Problem: SAFETY ADULT  Goal: Maintain or return to baseline ADL function  Description  INTERVENTIONS:  -  Assess patient's ability to carry out ADLs; assess patient's baseline for ADL function and identify physical deficits which impact ability to perform ADLs (bathing, care of mouth/teeth, toileting, grooming, dressing, etc )  - Assess/evaluate cause of self-care deficits   - Assess range of motion  - Assess patient's mobility; develop plan if impaired  - Assess patient's need for assistive devices and provide as appropriate  - Encourage maximum independence but intervene and supervise when necessary  - Involve family in performance of ADLs  - Assess for home care needs following discharge   - Consider OT consult to assist with ADL evaluation and planning for discharge  - Provide patient education as appropriate  Outcome: Progressing  Goal: Maintain or return mobility status to optimal level  Description  INTERVENTIONS:  - Assess patient's baseline mobility status (ambulation, transfers, stairs, etc )    - Identify cognitive and physical deficits and behaviors that affect mobility  - Identify mobility aids required to assist with transfers and/or ambulation (gait belt, sit-to-stand, lift, walker, cane, etc )  - [place] fall precautions as indicated by assessment  - Record patient progress and toleration of activity level on Mobility SBAR; progress patient to next Phase/Stage  - Instruct patient to call for assistance with activity based on assessment  - Consider rehabilitation consult to assist with strengthening/weightbearing, etc   Outcome: Progressing     Problem: DISCHARGE PLANNING  Goal: Discharge to home or other facility with appropriate resources  Description  INTERVENTIONS:  - Identify barriers to discharge w/patient and caregiver  - Arrange for needed discharge resources and transportation as appropriate  - Identify discharge learning needs (meds, wound care, etc )  - Arrange for interpretive services to assist at discharge as needed  - Refer to Case Management Department for coordinating discharge planning if the patient needs post-hospital services based on physician/advanced practitioner order or complex needs related to functional status, cognitive ability, or social support system  Outcome: Progressing     Problem: Knowledge Deficit  Goal: Patient/family/caregiver demonstrates understanding of disease process, treatment plan, medications, and discharge instructions  Description  Complete learning assessment and assess knowledge base    Interventions:  - Provide teaching at level of understanding  - Provide teaching via preferred learning methods  Outcome: Progressing

## 2020-01-18 NOTE — ASSESSMENT & PLAN NOTE
Diagnosed with large ischemic CVA December 17  Underwent rehab with large residual on the left side complete hemiparesis  Continue PT/OT therapy  High-intensity statin therapy continue  Continue adequate BP control

## 2020-01-18 NOTE — ASSESSMENT & PLAN NOTE
Will initiate time of fluid  Reports approximately 10 days ago she was exposed to influenza and was treated with Tamiflu  No diagnosis was done at that time  Given positive serology current symptoms will re-initiate Tamiflu therapy

## 2020-01-18 NOTE — ASSESSMENT & PLAN NOTE
Known history of COPD  Diagnosed by physical exam excessive wheezing/primarily expiratory  IV Solu-Medrol   Respiratory protocol  Supplemental oxygenation only if oxygen drops to less than 88%  [place]  Early ambulation  Follow-up on CTA chest resolved

## 2020-01-19 ENCOUNTER — APPOINTMENT (INPATIENT)
Dept: ULTRASOUND IMAGING | Facility: HOSPITAL | Age: 70
DRG: 190 | End: 2020-01-19
Payer: COMMERCIAL

## 2020-01-19 PROBLEM — J18.9 COMMUNITY ACQUIRED PNEUMONIA: Status: ACTIVE | Noted: 2020-01-19

## 2020-01-19 PROBLEM — J45.51 ASTHMA EXACERBATION, NON-ALLERGIC, SEVERE PERSISTENT: Status: ACTIVE | Noted: 2020-01-18

## 2020-01-19 LAB
ALBUMIN SERPL BCP-MCNC: 2.4 G/DL (ref 3.5–5)
ALP SERPL-CCNC: 302 U/L (ref 46–116)
ALT SERPL W P-5'-P-CCNC: 87 U/L (ref 12–78)
ANION GAP SERPL CALCULATED.3IONS-SCNC: 9 MMOL/L (ref 4–13)
AST SERPL W P-5'-P-CCNC: 72 U/L (ref 5–45)
ATRIAL RATE: 97 BPM
BILIRUB SERPL-MCNC: 0.3 MG/DL (ref 0.2–1)
BUN SERPL-MCNC: 9 MG/DL (ref 5–25)
CALCIUM SERPL-MCNC: 8.4 MG/DL (ref 8.3–10.1)
CHLORIDE SERPL-SCNC: 111 MMOL/L (ref 100–108)
CO2 SERPL-SCNC: 24 MMOL/L (ref 21–32)
CREAT SERPL-MCNC: 0.63 MG/DL (ref 0.6–1.3)
ERYTHROCYTE [DISTWIDTH] IN BLOOD BY AUTOMATED COUNT: 15.2 % (ref 11.6–15.1)
EST. AVERAGE GLUCOSE BLD GHB EST-MCNC: 117 MG/DL
GFR SERPL CREATININE-BSD FRML MDRD: 92 ML/MIN/1.73SQ M
GLUCOSE SERPL-MCNC: 107 MG/DL (ref 65–140)
GLUCOSE SERPL-MCNC: 119 MG/DL (ref 65–140)
GLUCOSE SERPL-MCNC: 131 MG/DL (ref 65–140)
GLUCOSE SERPL-MCNC: 138 MG/DL (ref 65–140)
GLUCOSE SERPL-MCNC: 142 MG/DL (ref 65–140)
GLUCOSE SERPL-MCNC: 142 MG/DL (ref 65–140)
HBA1C MFR BLD: 5.7 % (ref 4.2–6.3)
HCT VFR BLD AUTO: 33.6 % (ref 34.8–46.1)
HGB BLD-MCNC: 10.6 G/DL (ref 11.5–15.4)
MCH RBC QN AUTO: 23.2 PG (ref 26.8–34.3)
MCHC RBC AUTO-ENTMCNC: 31.5 G/DL (ref 31.4–37.4)
MCV RBC AUTO: 74 FL (ref 82–98)
P AXIS: 67 DEGREES
PLATELET # BLD AUTO: 212 THOUSANDS/UL (ref 149–390)
PMV BLD AUTO: 9.5 FL (ref 8.9–12.7)
POTASSIUM SERPL-SCNC: 3.6 MMOL/L (ref 3.5–5.3)
PR INTERVAL: 168 MS
PROCALCITONIN SERPL-MCNC: 0.06 NG/ML
PROT SERPL-MCNC: 6.3 G/DL (ref 6.4–8.2)
QRS AXIS: 51 DEGREES
QRSD INTERVAL: 68 MS
QT INTERVAL: 314 MS
QTC INTERVAL: 398 MS
RBC # BLD AUTO: 4.56 MILLION/UL (ref 3.81–5.12)
SODIUM SERPL-SCNC: 144 MMOL/L (ref 136–145)
T WAVE AXIS: 52 DEGREES
VENTRICULAR RATE: 97 BPM
WBC # BLD AUTO: 3.24 THOUSAND/UL (ref 4.31–10.16)

## 2020-01-19 PROCEDURE — 80053 COMPREHEN METABOLIC PANEL: CPT | Performed by: INTERNAL MEDICINE

## 2020-01-19 PROCEDURE — 76705 ECHO EXAM OF ABDOMEN: CPT

## 2020-01-19 PROCEDURE — 82948 REAGENT STRIP/BLOOD GLUCOSE: CPT

## 2020-01-19 PROCEDURE — 85027 COMPLETE CBC AUTOMATED: CPT | Performed by: INTERNAL MEDICINE

## 2020-01-19 PROCEDURE — 99233 SBSQ HOSP IP/OBS HIGH 50: CPT | Performed by: STUDENT IN AN ORGANIZED HEALTH CARE EDUCATION/TRAINING PROGRAM

## 2020-01-19 PROCEDURE — 94760 N-INVAS EAR/PLS OXIMETRY 1: CPT

## 2020-01-19 PROCEDURE — 94640 AIRWAY INHALATION TREATMENT: CPT

## 2020-01-19 PROCEDURE — 83036 HEMOGLOBIN GLYCOSYLATED A1C: CPT | Performed by: INTERNAL MEDICINE

## 2020-01-19 PROCEDURE — 93010 ELECTROCARDIOGRAM REPORT: CPT | Performed by: INTERNAL MEDICINE

## 2020-01-19 RX ORDER — PROMETHAZINE HYDROCHLORIDE 6.25 MG/5ML
12.5 SYRUP ORAL EVERY 4 HOURS PRN
Status: DISCONTINUED | OUTPATIENT
Start: 2020-01-19 | End: 2020-01-19

## 2020-01-19 RX ORDER — LEVALBUTEROL 1.25 MG/.5ML
1.25 SOLUTION, CONCENTRATE RESPIRATORY (INHALATION) 4 TIMES DAILY
Status: DISCONTINUED | OUTPATIENT
Start: 2020-01-19 | End: 2020-01-19

## 2020-01-19 RX ORDER — ALBUTEROL SULFATE 2.5 MG/3ML
2.5 SOLUTION RESPIRATORY (INHALATION) EVERY 6 HOURS PRN
Status: DISCONTINUED | OUTPATIENT
Start: 2020-01-19 | End: 2020-01-24 | Stop reason: HOSPADM

## 2020-01-19 RX ORDER — ONDANSETRON 2 MG/ML
4 INJECTION INTRAMUSCULAR; INTRAVENOUS EVERY 4 HOURS PRN
Status: DISCONTINUED | OUTPATIENT
Start: 2020-01-19 | End: 2020-01-24 | Stop reason: HOSPADM

## 2020-01-19 RX ORDER — LEVALBUTEROL 1.25 MG/.5ML
1.25 SOLUTION, CONCENTRATE RESPIRATORY (INHALATION)
Status: DISCONTINUED | OUTPATIENT
Start: 2020-01-19 | End: 2020-01-23

## 2020-01-19 RX ORDER — FLUTICASONE PROPIONATE 50 MCG
1 SPRAY, SUSPENSION (ML) NASAL DAILY
Status: DISCONTINUED | OUTPATIENT
Start: 2020-01-19 | End: 2020-01-24 | Stop reason: HOSPADM

## 2020-01-19 RX ADMIN — FLUTICASONE PROPIONATE 1 SPRAY: 50 SPRAY, METERED NASAL at 11:27

## 2020-01-19 RX ADMIN — LEVALBUTEROL HYDROCHLORIDE 1.25 MG: 1.25 SOLUTION, CONCENTRATE RESPIRATORY (INHALATION) at 13:38

## 2020-01-19 RX ADMIN — DOXYCYCLINE 100 MG: 100 CAPSULE ORAL at 21:58

## 2020-01-19 RX ADMIN — LEVALBUTEROL HYDROCHLORIDE 1.25 MG: 1.25 SOLUTION, CONCENTRATE RESPIRATORY (INHALATION) at 07:51

## 2020-01-19 RX ADMIN — DOCUSATE SODIUM 200 MG: 100 CAPSULE, LIQUID FILLED ORAL at 09:34

## 2020-01-19 RX ADMIN — METHYLPREDNISOLONE SODIUM SUCCINATE 60 MG: 125 INJECTION, POWDER, FOR SOLUTION INTRAMUSCULAR; INTRAVENOUS at 11:27

## 2020-01-19 RX ADMIN — ENOXAPARIN SODIUM 40 MG: 40 INJECTION SUBCUTANEOUS at 09:35

## 2020-01-19 RX ADMIN — LEVETIRACETAM 1500 MG: 500 TABLET, FILM COATED ORAL at 09:35

## 2020-01-19 RX ADMIN — DOCUSATE SODIUM 200 MG: 100 CAPSULE, LIQUID FILLED ORAL at 17:31

## 2020-01-19 RX ADMIN — MONTELUKAST SODIUM 10 MG: 10 TABLET, FILM COATED ORAL at 09:35

## 2020-01-19 RX ADMIN — IPRATROPIUM BROMIDE 0.5 MG: 0.5 SOLUTION RESPIRATORY (INHALATION) at 15:43

## 2020-01-19 RX ADMIN — LACTULOSE 10 G: 10 SOLUTION ORAL at 09:36

## 2020-01-19 RX ADMIN — ZOLPIDEM TARTRATE 5 MG: 5 TABLET, FILM COATED ORAL at 21:58

## 2020-01-19 RX ADMIN — METHYLPREDNISOLONE SODIUM SUCCINATE 60 MG: 125 INJECTION, POWDER, FOR SOLUTION INTRAMUSCULAR; INTRAVENOUS at 00:48

## 2020-01-19 RX ADMIN — ONDANSETRON 4 MG: 2 INJECTION INTRAMUSCULAR; INTRAVENOUS at 09:39

## 2020-01-19 RX ADMIN — FOLIC ACID 1 MG: 1 TABLET ORAL at 09:34

## 2020-01-19 RX ADMIN — PANTOPRAZOLE SODIUM 40 MG: 40 TABLET, DELAYED RELEASE ORAL at 05:24

## 2020-01-19 RX ADMIN — LISINOPRIL 10 MG: 10 TABLET ORAL at 09:35

## 2020-01-19 RX ADMIN — HYDROCODONE BITARTRATE AND HOMATROPINE METHYLBROMIDE 5 ML: 5; 1.5 SOLUTION ORAL at 20:05

## 2020-01-19 RX ADMIN — IPRATROPIUM BROMIDE 0.5 MG: 0.5 SOLUTION RESPIRATORY (INHALATION) at 13:38

## 2020-01-19 RX ADMIN — CEFTRIAXONE SODIUM 1000 MG: 10 INJECTION, POWDER, FOR SOLUTION INTRAVENOUS at 17:33

## 2020-01-19 RX ADMIN — DOXYCYCLINE 100 MG: 100 CAPSULE ORAL at 09:34

## 2020-01-19 RX ADMIN — IPRATROPIUM BROMIDE 0.5 MG: 0.5 SOLUTION RESPIRATORY (INHALATION) at 07:51

## 2020-01-19 RX ADMIN — IPRATROPIUM BROMIDE 0.5 MG: 0.5 SOLUTION RESPIRATORY (INHALATION) at 02:53

## 2020-01-19 RX ADMIN — GUAIFENESIN 200 MG: 200 SOLUTION ORAL at 05:24

## 2020-01-19 RX ADMIN — ASPIRIN 81 MG 81 MG: 81 TABLET ORAL at 09:35

## 2020-01-19 RX ADMIN — ATORVASTATIN CALCIUM 40 MG: 40 TABLET, FILM COATED ORAL at 09:34

## 2020-01-19 RX ADMIN — GUAIFENESIN AND DEXTROMETHORPHAN 10 ML: 100; 10 SYRUP ORAL at 02:50

## 2020-01-19 RX ADMIN — POLYETHYLENE GLYCOL 3350 17 G: 17 POWDER, FOR SOLUTION ORAL at 17:31

## 2020-01-19 RX ADMIN — IPRATROPIUM BROMIDE 0.5 MG: 0.5 SOLUTION RESPIRATORY (INHALATION) at 20:03

## 2020-01-19 RX ADMIN — ACETAMINOPHEN 650 MG: 325 TABLET ORAL at 05:25

## 2020-01-19 RX ADMIN — LEVALBUTEROL HYDROCHLORIDE 1.25 MG: 1.25 SOLUTION, CONCENTRATE RESPIRATORY (INHALATION) at 02:53

## 2020-01-19 RX ADMIN — BENZONATATE 100 MG: 100 CAPSULE ORAL at 12:39

## 2020-01-19 RX ADMIN — METHYLPREDNISOLONE SODIUM SUCCINATE 60 MG: 125 INJECTION, POWDER, FOR SOLUTION INTRAMUSCULAR; INTRAVENOUS at 17:32

## 2020-01-19 RX ADMIN — METHYLPREDNISOLONE SODIUM SUCCINATE 60 MG: 125 INJECTION, POWDER, FOR SOLUTION INTRAMUSCULAR; INTRAVENOUS at 05:24

## 2020-01-19 RX ADMIN — LEVALBUTEROL HYDROCHLORIDE 1.25 MG: 1.25 SOLUTION, CONCENTRATE RESPIRATORY (INHALATION) at 15:43

## 2020-01-19 RX ADMIN — POTASSIUM CHLORIDE 10 MEQ: 750 TABLET, EXTENDED RELEASE ORAL at 17:32

## 2020-01-19 RX ADMIN — LEVETIRACETAM 1500 MG: 500 TABLET, FILM COATED ORAL at 17:31

## 2020-01-19 RX ADMIN — POTASSIUM CHLORIDE 10 MEQ: 750 TABLET, EXTENDED RELEASE ORAL at 09:35

## 2020-01-19 RX ADMIN — AZITHROMYCIN 500 MG: 250 TABLET, FILM COATED ORAL at 14:57

## 2020-01-19 RX ADMIN — LEVALBUTEROL HYDROCHLORIDE 1.25 MG: 1.25 SOLUTION, CONCENTRATE RESPIRATORY (INHALATION) at 20:02

## 2020-01-19 NOTE — ASSESSMENT & PLAN NOTE
· Status post right upper quadrant ultra sound showing potential portal venous hypertension  · Will consider GI consult in a m , otherwise continue to monitor with daily liver function tests

## 2020-01-19 NOTE — ASSESSMENT & PLAN NOTE
· Has severe persistent asthma, requiring 7 intubations previously  · Currently with diffuse expiratory wheeze in all lung fields bilaterally  · Currently on Xopenex and Atrovent along with IV Solu-Medrol q 6 hours with minimal improvement  · Will consult pulmonology, continue with current management and reassess in the morning  · Continue with symptom management for cough  · Asthma exacerbation likely secondary to underlying influenza and potential pneumonia

## 2020-01-19 NOTE — ASSESSMENT & PLAN NOTE
· Confirmed by CT a imaging showing right upper lobe consolidation concerning for pneumonia  · On day 2 of IV antibiotics, continue with current management  · Follow-up cultures, continued symptom control

## 2020-01-19 NOTE — UTILIZATION REVIEW
Initial Clinical Review    Admission: Date/Time/Statement: Inpatient Admission Orders (From admission, onward)     Ordered        01/18/20 1446  Inpatient Admission  Once                   Orders Placed This Encounter   Procedures    Inpatient Admission     Standing Status:   Standing     Number of Occurrences:   1     Order Specific Question:   Admitting Physician     Answer:   Walter Kingston [[id number]]     Order Specific Question:   Level of Care     Answer:   Med Surg [16]     Order Specific Question:   Estimated length of stay     Answer:   More than 2 Midnights     Order Specific Question:   Certification     Answer:   I certify that inpatient services are medically necessary for this patient for a duration of greater than two midnights  See H&P and MD Progress Notes for additional information about the patient's course of treatment  ED Arrival Information     Expected Arrival Acuity Means of Arrival Escorted By Service Admission Type    - 1/18/2020 11:33 Emergent Wheelchair Family Member General Medicine Emergency    Arrival Complaint    sob        Chief Complaint   Patient presents with    Shortness of Breath     Pt presents to ED with co SOB, cough, congestion, sore throat that started 2 days ago  Per family pt has stroke last month  Assessment/Plan: 72 yo female to ED from home w/ c/o ongoing cough runny nose generalized malaise and fatigue  She reports symptoms started approximately 3 days ago  Reports 1 episode of fever but was not able to measure temperature failed"  Admitted IP status w/ COPD exacerbation   Plan to treat w/ iv solumedrol , UDN , supplemental O2 is sat drops <88 %   Treated w/ tamiflu 10 days ago after exposure  + flu in ED will reinitiate tamiflu  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 , Cont PT OT and BP control        PE ; Prolonged expiratory phase, rales in bilateral lower lobes, wheezing throughout all lung fields primarily expiratory wheezing  ED Triage Vitals [01/18/20 1153]   Temperature Pulse Respirations Blood Pressure SpO2   100 1 °F (37 8 °C) (!) 120 20 (!) 210/128 95 %      Temp Source Heart Rate Source Patient Position - Orthostatic VS BP Location FiO2 (%)   Oral Monitor Sitting Right arm --      Pain Score       Worst Possible Pain        Wt Readings from Last 1 Encounters:   01/19/20 80 6 kg (177 lb 11 1 oz)     Additional Vital Signs:   01/19/20 08:17:51  98 6 °F (37 °C)  79  18  154/71  99  93 %       01/19/20 0752              None (Room air)     01/19/20 0300              None (Room air)     01/19/20 0253            98 %  None (Room air)     01/18/20 23:16:20  98 3 °F (36 8 °C)  90  16  137/75  96  95 %       01/18/20 2300              None (Room air)     01/18/20 2000            96 %  None (Room air)     01/18/20 17:38:38  98 4 °F (36 9 °C)  95  18  123/62  82  96 %  None (Room air)  Lying   01/18/20 1639        136/64           01/18/20 1638    100  24Abnormal       95 %  None (Room air)  Lying   01/18/20 1530    103  20  121/90    95 %  None (Room air)  Lying   01/18/20 1500  98 4 °F (36 9 °C)                 01/18/20 1430    110Abnormal   20  156/77    100 %    Lying   01/18/20 1400    96  20  146/66    100 %    Lying   01/18/20 1330    83  20  146/72    100 %    Lying   01/18/20 1326              None (Room air)     01/18/20 1318    87  22      99 %  None (Room air)     01/18/20 1230    92  20  170/103Abnormal     97 %  None (Room air)  Lying   01/18/20 1209    102  20  159/103Abnormal     97 %  None (Room air       Pertinent Labs/Diagnostic Test Results:   Results from last 7 days   Lab Units 01/19/20  0527 01/18/20  1901 01/18/20  1241   WBC Thousand/uL 3 24*  --  6 20   HEMOGLOBIN g/dL 10 6*  --  12 1   HEMATOCRIT % 33 6*  --  37 8   PLATELETS Thousands/uL 212 220 258   NEUTROS ABS Thousands/µL  --   --  4 41     Results from last 7 days   Lab Units 01/19/20  0562 01/18/20  1241   SODIUM mmol/L 144 140   POTASSIUM mmol/L 3 6 3 9   CHLORIDE mmol/L 111* 104   CO2 mmol/L 24 26   ANION GAP mmol/L 9 10   BUN mg/dL 9 11   CREATININE mg/dL 0 63 0 71   EGFR ml/min/1 73sq m 92 87   CALCIUM mg/dL 8 4 8 8     Results from last 7 days   Lab Units 01/19/20  0527 01/18/20  1241   AST U/L 72* 90*   ALT U/L 87* 98*   ALK PHOS U/L 302* 357*   TOTAL PROTEIN g/dL 6 3* 7 2   ALBUMIN g/dL 2 4* 3 0*   TOTAL BILIRUBIN mg/dL 0 30 0 50     Results from last 7 days   Lab Units 01/19/20  0604 01/18/20  2109 01/18/20  1756   POC GLUCOSE mg/dl 142* 166* 198*     Results from last 7 days   Lab Units 01/19/20  0527 01/18/20  1241   GLUCOSE RANDOM mg/dL 142* 99     Results from last 7 days   Lab Units 01/18/20  1241   TROPONIN I ng/mL <0 02     Results from last 7 days   Lab Units 01/18/20  1314   D-DIMER QUANTITATIVE ug/ml FEU 0 88*     Results from last 7 days   Lab Units 01/18/20  1902   TSH 3RD GENERATON uIU/mL 0 419     Results from last 7 days   Lab Units 01/18/20  1901   PROCALCITONIN ng/ml 0 06     Results from last 7 days   Lab Units 01/18/20  1241   LACTIC ACID mmol/L 1 4     Results from last 7 days   Lab Units 01/18/20  1216   INFLUENZA A PCR  None Detected   RSV PCR  None Detected     Results from last 7 days   Lab Units 01/18/20  1257 01/18/20  1241   BLOOD CULTURE  Received in Microbiology Lab  Culture in Progress  Received in Microbiology Lab  Culture in Progress       ED Treatment:   Medication Administration from 01/18/2020 1133 to 01/18/2020 1648       Date/Time Order Dose Route Action     01/18/2020 1309 acetaminophen (TYLENOL) tablet 650 mg 650 mg Oral Given     01/18/2020 1317 sodium chloride 0 9 % bolus 1,000 mL 1,000 mL Intravenous New Bag     01/18/2020 1318 albuterol inhalation solution 10 mg 10 mg Nebulization Given     01/18/2020 1318 ipratropium (ATROVENT) 0 02 % inhalation solution 1 mg 1 mg Nebulization Given     01/18/2020 1318 sodium chloride 0 9 % inhalation solution 12 mL 12 mL Nebulization Given     01/18/2020 1315 methylPREDNISolone sodium succinate (Solu-MEDROL) injection 125 mg 125 mg Intravenous Given     01/18/2020 1441 sodium chloride 0 9 % bolus 1,000 mL 1,000 mL Intravenous New Bag     01/18/2020 1502 dextromethorphan-guaiFENesin (ROBITUSSIN DM)  mg/5 mL oral syrup 10 mL 10 mL Oral Given     01/18/2020 1605 azithromycin (ZITHROMAX) tablet 500 mg 500 mg Oral Given        Past Medical History:   Diagnosis Date    Asthma     C[facility]bral infarction ([facility] )     Diabetes mellitus ([facility] )     GERD (gastroesophageal reflux disease)     Hyperlipidemia     Hypertension     Stroke ([place] [address] )     12/19    SV[facility]ra[facility]ricular tachycardia[facility])      Present on Admission:  **None**      Admitting Diagnosis: Bronchospasm [J98 01]  Dyspnea [R06 00]  SOB (shortness of breath) [R06 02]  Influenza B [J10 1]  CVA (cerebral vascular accident) ([facility] ) [I63 9]  Left-sided weakness [R53 1]  Age/Sex: 71 y o  female  Admission Orders:  Scheduled Medications:    Medications:  aspirin 81 mg Oral Daily   atorvastatin 40 mg Oral Daily   azithromycin 500 mg Oral Q24H   cefTRIAXone 1,000 mg Intravenous Q24H   docusate sodium 200 mg Oral BID   doxycycline hyclate 100 mg Oral Q12H [address]   enoxaparin 40 mg Subcutaneous Daily   folic acid 1 mg Oral Daily   guaiFENesin 200 mg Oral Q12H   insulin lispro 1-5 Units Subcutaneous 4 times day   ipratropium 0 5 mg Nebulization Q6H   lactulose 10 g Oral Daily   levalbuterol 1 25 mg Nebulization Q6H   levETIRAcetam 1,500 mg Oral BID   lisinopril 10 mg Oral Daily   methylPREDNISolone sodium succinate 60 mg Intravenous Q6H [place]ING[place]   montelukast 10 mg Oral Daily   pantoprazole 40 mg Oral Early Morning   polyethylene glycol 17 g Oral BID   potassium chloride 10 mEq Oral BID   zolpidem 5 mg Oral HS     Continuous IV Infusions:    sodium chloride 125 mL/hr Intravenous Continuous   sodium chloride 50 mL/hr Intravenous Continuous     PRN Meds:    acetaminophen 650 mg Oral Q6H PRN acetaminophen 650 mg Oral Q6H PRN   benzonatate 100 mg Oral TID PRN   dextromethorphan-guaiFENesin 10 mL Oral Q4H PRN   ondansetron 4 mg Intravenous Q6H PRN     Fingerstick ac and hs  I&O   Daily weight   Cardiac diet   OT PT eval   amb pt   Fall precautions  Droplet isolation   IP CONSULT TO CASE MANAGEMENT    Network Utilization Review Department  [email or web address]  ATTENTION: Please call with any questions or concerns to [phone] and carefully listen to the prompts so that you are directed to the right person  All voicemails are confidential   Nettie Rivas all requests for admission clinical reviews, approved or denied determinations and any other requests to dedicated fax number below belonging to the campus where the patient is receiving treatment   List of dedicated fax numbers for the Facilities:  [address] DENIALS (Administrative/Medical Necessity) [phone]   [address] (Maternity/NICU/Pediatrics) [phone]   [facility] [phone]   [facility] [phone]   [facility] [phone]   [facility] [place] [phone]   [address] [facility] [address] [place] [address] [phone]   [facility] [phone]   [address] [facility] [address]  [facility] [address] [facility] [phone]

## 2020-01-19 NOTE — PLAN OF CARE
Problem: Potential for Falls  Goal: Patient will remain free of falls  Description  INTERVENTIONS:  - Assess patient frequently for physical needs  -  Identify cognitive and physical deficits and behaviors that affect risk of falls    -  [place] fall precautions as indicated by assessment   - Educate patient/family on patient safety including physical limitations  - Instruct patient to call for assistance with activity based on assessment  - Modify environment to reduce risk of injury  - Consider OT/PT consult to assist with strengthening/mobility  Outcome: Progressing     Problem: PAIN - ADULT  Goal: Verbalizes/displays adequate comfort level or baseline comfort level  Description  Interventions:  - Encourage patient to monitor pain and request assistance  - Assess pain using appropriate pain scale  - Administer analgesics based on type and severity of pain and evaluate response  - Implement non-pharmacological measures as appropriate and evaluate response  - Consider cultural and social influences on pain and pain management  - Notify physician/advanced practitioner if interventions unsuccessful or patient reports new pain  Outcome: Progressing     Problem: INFECTION - ADULT  Goal: Absence or prevention of progression during hospitalization  Description  INTERVENTIONS:  - Assess and monitor for signs and symptoms of infection  - Monitor lab/diagnostic results  - Monitor all insertion sites, i e  indwelling lines, tubes, and drains  - Monitor endotracheal if appropriate and nasal secretions for changes in amount and color  - [place] appropriate cooling/warming therapies per order  - Administer medications as ordered  - Instruct and encourage patient and family to use good hand hygiene technique  - Identify and instruct in appropriate isolation precautions for identified infection/condition  Outcome: Progressing  Goal: Absence of fever/infection during neutropenic period  Description  INTERVENTIONS:  - Monitor WBC    Outcome: Progressing     Problem: SAFETY ADULT  Goal: Maintain or return to baseline ADL function  Description  INTERVENTIONS:  -  Assess patient's ability to carry out ADLs; assess patient's baseline for ADL function and identify physical deficits which impact ability to perform ADLs (bathing, care of mouth/teeth, toileting, grooming, dressing, etc )  - Assess/evaluate cause of self-care deficits   - Assess range of motion  - Assess patient's mobility; develop plan if impaired  - Assess patient's need for assistive devices and provide as appropriate  - Encourage maximum independence but intervene and supervise when necessary  - Involve family in performance of ADLs  - Assess for home care needs following discharge   - Consider OT consult to assist with ADL evaluation and planning for discharge  - Provide patient education as appropriate  Outcome: Progressing  Goal: Maintain or return mobility status to optimal level  Description  INTERVENTIONS:  - Assess patient's baseline mobility status (ambulation, transfers, stairs, etc )    - Identify cognitive and physical deficits and behaviors that affect mobility  - Identify mobility aids required to assist with transfers and/or ambulation (gait belt, sit-to-stand, lift, walker, cane, etc )  - [place] fall precautions as indicated by assessment  - Record patient progress and toleration of activity level on Mobility SBAR; progress patient to next Phase/Stage  - Instruct patient to call for assistance with activity based on assessment  - Consider rehabilitation consult to assist with strengthening/weightbearing, etc   Outcome: Progressing     Problem: DISCHARGE PLANNING  Goal: Discharge to home or other facility with appropriate resources  Description  INTERVENTIONS:  - Identify barriers to discharge w/patient and caregiver  - Arrange for needed discharge resources and transportation as appropriate  - Identify discharge learning needs (meds, wound care, etc )  - Arrange for interpretive services to assist at discharge as needed  - Refer to Case Management Department for coordinating discharge planning if the patient needs post-hospital services based on physician/advanced practitioner order or complex needs related to functional status, cognitive ability, or social support system  Outcome: Progressing     Problem: Knowledge Deficit  Goal: Patient/family/caregiver demonstrates understanding of disease process, treatment plan, medications, and discharge instructions  Description  Complete learning assessment and assess knowledge base  Interventions:  - Provide teaching at level of understanding  - Provide teaching via preferred learning methods  Outcome: Progressing     Problem: Nutrition/Hydration-ADULT  Goal: Nutrient/Hydration intake appropriate for improving, restoring or maintaining nutritional needs  Description  Monitor and assess patient's nutrition/hydration status for malnutrition  Collaborate with interdisciplinary team and initiate plan and interventions as ordered  Monitor patient's weight and dietary intake as ordered or per policy  Utilize nutrition screening tool and intervene as necessary  Determine patient's food preferences and provide high-protein, high-caloric foods as appropriate       INTERVENTIONS:  - Monitor oral intake, urinary output, labs, and treatment plans  - Assess nutrition and hydration status and recommend course of action  - Evaluate amount of meals eaten  - Assist patient with eating if necessary   - Allow adequate time for meals  - Recommend/ encourage appropriate diets, oral nutritional supplements, and vitamin/mineral supplements  - Order, calculate, and assess calorie counts as needed  - Recommend, monitor, and adjust tube feedings and TPN/PPN based on assessed needs  - Assess need for intravenous fluids  - Provide specific nutrition/hydration education as appropriate  - Include patient/family/caregiver in decisions related to nutrition  Outcome: Progressing

## 2020-01-19 NOTE — ASSESSMENT & PLAN NOTE
No results found for: HGBA1C    Recent Labs     01/18/20  1756 01/18/20  2109 01/19/20  0604 01/19/20  1143   POCGLU 198* 166* 142* 138       Blood Sugar Average: Last 72 hrs:  (P) 161  Glucose levels with good control, continue with current management

## 2020-01-19 NOTE — UTILIZATION REVIEW
Notification of Inpatient Admission/Inpatient Authorization Request   This is a Notification of Inpatient Admission for [address]  Be advised that this patient was admitted to our facility under Inpatient Status  Contact Kristen Raza at [phone] for additional admission information  [facility] DEPT DEDICATED Ap Cline [phone]  Patient Name:   Meredith Pena   YOB: 1950       [address]   [address]:   [facility]    Tax ID: [id number]  NPI: [id number] Attending Provider/NPI: Gearold Apley, Md [[id number]]   Place of Service Code: 24     Place of Service Name:  [facility]   Start Date: 1/18/20 1446     Discharge Date & Time: No discharge date for patient encounter  Type of Admission: Inpatient Status Discharge Disposition   (if discharged): Final discharge disposition not confirmed   Patient Diagnoses: Bronchospasm [J98 01]  Dyspnea [R06 00]  SOB (shortness of breath) [R06 02]  Influenza B [J10 1]  CVA (cerebral vascular accident) ([facility] ) [I63 9]  Left-sided weakness [R53 1]     Orders: Admission Orders (From admission, onward)     Ordered        01/18/20 1446  Inpatient Admission  Once                    Assigned Utilization Review Contact: Kristen Raza  Utilization   Network Utilization Review Department  Phone: [phone]; Fax [phone]  Email: [email or web address]   ATTENTION PAYERS: Please call the assigned Utilization  directly with any questions or concerns ALL voicemails in the department are confidential  Send all requests for admission clinical reviews, approved or denied determinations and any other requests to dedicated fax number belonging to the campus where the patient is receiving treatment

## 2020-01-19 NOTE — ASSESSMENT & PLAN NOTE
· Status post treatment with Tamiflu as an outpatient  · No indication further treatment at this time  · Has superimposed bacterial pneumonia and right upper lobe  · Continue with antibiotics for community-acquired pneumonia

## 2020-01-19 NOTE — ASSESSMENT & PLAN NOTE
· At previous ischemic CVA back in 12/2017, status post rehab  · Still has residual left-sided hemiparesis  · Continue with high-intensity statin, blood pressure control  · Will continue with PT and OT along with fall precaution

## 2020-01-20 LAB
ALBUMIN SERPL BCP-MCNC: 2.3 G/DL (ref 3.5–5)
ALBUMIN SERPL BCP-MCNC: 2.4 G/DL (ref 3.5–5)
ALP SERPL-CCNC: 263 U/L (ref 46–116)
ALT SERPL W P-5'-P-CCNC: 72 U/L (ref 12–78)
ANION GAP SERPL CALCULATED.3IONS-SCNC: 7 MMOL/L (ref 4–13)
AST SERPL W P-5'-P-CCNC: 48 U/L (ref 5–45)
BASOPHILS # BLD AUTO: 0.01 THOUSANDS/ΜL (ref 0–0.1)
BASOPHILS NFR BLD AUTO: 0 % (ref 0–1)
BILIRUB DIRECT SERPL-MCNC: 0.09 MG/DL (ref 0–0.2)
BILIRUB SERPL-MCNC: 0.3 MG/DL (ref 0.2–1)
BUN SERPL-MCNC: 12 MG/DL (ref 5–25)
CALCIUM SERPL-MCNC: 8.5 MG/DL (ref 8.3–10.1)
CHLORIDE SERPL-SCNC: 110 MMOL/L (ref 100–108)
CO2 SERPL-SCNC: 26 MMOL/L (ref 21–32)
CREAT SERPL-MCNC: 0.77 MG/DL (ref 0.6–1.3)
EOSINOPHIL # BLD AUTO: 0 THOUSAND/ΜL (ref 0–0.61)
EOSINOPHIL NFR BLD AUTO: 0 % (ref 0–6)
ERYTHROCYTE [DISTWIDTH] IN BLOOD BY AUTOMATED COUNT: 15.6 % (ref 11.6–15.1)
GFR SERPL CREATININE-BSD FRML MDRD: 79 ML/MIN/1.73SQ M
GLUCOSE SERPL-MCNC: 106 MG/DL (ref 65–140)
GLUCOSE SERPL-MCNC: 114 MG/DL (ref 65–140)
GLUCOSE SERPL-MCNC: 120 MG/DL (ref 65–140)
GLUCOSE SERPL-MCNC: 127 MG/DL (ref 65–140)
GLUCOSE SERPL-MCNC: 142 MG/DL (ref 65–140)
HCT VFR BLD AUTO: 33.2 % (ref 34.8–46.1)
HGB BLD-MCNC: 10.5 G/DL (ref 11.5–15.4)
IMM GRANULOCYTES # BLD AUTO: 0.03 THOUSAND/UL (ref 0–0.2)
IMM GRANULOCYTES NFR BLD AUTO: 0 % (ref 0–2)
INR PPP: 1.09 (ref 0.84–1.19)
LYMPHOCYTES # BLD AUTO: 0.91 THOUSANDS/ΜL (ref 0.6–4.47)
LYMPHOCYTES NFR BLD AUTO: 11 % (ref 14–44)
MAGNESIUM SERPL-MCNC: 1.8 MG/DL (ref 1.6–2.6)
MCH RBC QN AUTO: 23.3 PG (ref 26.8–34.3)
MCHC RBC AUTO-ENTMCNC: 31.6 G/DL (ref 31.4–37.4)
MCV RBC AUTO: 74 FL (ref 82–98)
MONOCYTES # BLD AUTO: 0.47 THOUSAND/ΜL (ref 0.17–1.22)
MONOCYTES NFR BLD AUTO: 6 % (ref 4–12)
NEUTROPHILS # BLD AUTO: 6.57 THOUSANDS/ΜL (ref 1.85–7.62)
NEUTS SEG NFR BLD AUTO: 83 % (ref 43–75)
NRBC BLD AUTO-RTO: 0 /100 WBCS
PHOSPHATE SERPL-MCNC: 2.8 MG/DL (ref 2.3–4.1)
PLATELET # BLD AUTO: 225 THOUSANDS/UL (ref 149–390)
PMV BLD AUTO: 10.1 FL (ref 8.9–12.7)
POTASSIUM SERPL-SCNC: 4 MMOL/L (ref 3.5–5.3)
PROT SERPL-MCNC: 6.1 G/DL (ref 6.4–8.2)
PROTHROMBIN TIME: 14.1 SECONDS (ref 11.6–14.5)
RBC # BLD AUTO: 4.5 MILLION/UL (ref 3.81–5.12)
SODIUM SERPL-SCNC: 143 MMOL/L (ref 136–145)
WBC # BLD AUTO: 7.99 THOUSAND/UL (ref 4.31–10.16)

## 2020-01-20 PROCEDURE — 82948 REAGENT STRIP/BLOOD GLUCOSE: CPT

## 2020-01-20 PROCEDURE — 99223 1ST HOSP IP/OBS HIGH 75: CPT | Performed by: INTERNAL MEDICINE

## 2020-01-20 PROCEDURE — 94640 AIRWAY INHALATION TREATMENT: CPT

## 2020-01-20 PROCEDURE — 99233 SBSQ HOSP IP/OBS HIGH 50: CPT | Performed by: STUDENT IN AN ORGANIZED HEALTH CARE EDUCATION/TRAINING PROGRAM

## 2020-01-20 PROCEDURE — 83735 ASSAY OF MAGNESIUM: CPT | Performed by: STUDENT IN AN ORGANIZED HEALTH CARE EDUCATION/TRAINING PROGRAM

## 2020-01-20 PROCEDURE — 80069 RENAL FUNCTION PANEL: CPT | Performed by: STUDENT IN AN ORGANIZED HEALTH CARE EDUCATION/TRAINING PROGRAM

## 2020-01-20 PROCEDURE — 85610 PROTHROMBIN TIME: CPT | Performed by: PHYSICIAN ASSISTANT

## 2020-01-20 PROCEDURE — 94760 N-INVAS EAR/PLS OXIMETRY 1: CPT

## 2020-01-20 PROCEDURE — 80076 HEPATIC FUNCTION PANEL: CPT | Performed by: PHYSICIAN ASSISTANT

## 2020-01-20 PROCEDURE — 97167 OT EVAL HIGH COMPLEX 60 MIN: CPT

## 2020-01-20 PROCEDURE — 97163 PT EVAL HIGH COMPLEX 45 MIN: CPT

## 2020-01-20 PROCEDURE — 85025 COMPLETE CBC W/AUTO DIFF WBC: CPT | Performed by: STUDENT IN AN ORGANIZED HEALTH CARE EDUCATION/TRAINING PROGRAM

## 2020-01-20 RX ORDER — ZOLPIDEM TARTRATE 5 MG/1
5 TABLET ORAL
Status: COMPLETED | OUTPATIENT
Start: 2020-01-20 | End: 2020-01-21

## 2020-01-20 RX ADMIN — IPRATROPIUM BROMIDE 0.5 MG: 0.5 SOLUTION RESPIRATORY (INHALATION) at 16:00

## 2020-01-20 RX ADMIN — HYDROCODONE BITARTRATE AND HOMATROPINE METHYLBROMIDE 5 ML: 5; 1.5 SOLUTION ORAL at 03:38

## 2020-01-20 RX ADMIN — METHYLPREDNISOLONE SODIUM SUCCINATE 60 MG: 125 INJECTION, POWDER, FOR SOLUTION INTRAMUSCULAR; INTRAVENOUS at 13:00

## 2020-01-20 RX ADMIN — LEVALBUTEROL HYDROCHLORIDE 1.25 MG: 1.25 SOLUTION, CONCENTRATE RESPIRATORY (INHALATION) at 19:53

## 2020-01-20 RX ADMIN — POTASSIUM CHLORIDE 10 MEQ: 750 TABLET, EXTENDED RELEASE ORAL at 17:25

## 2020-01-20 RX ADMIN — METHYLPREDNISOLONE SODIUM SUCCINATE 60 MG: 125 INJECTION, POWDER, FOR SOLUTION INTRAMUSCULAR; INTRAVENOUS at 00:32

## 2020-01-20 RX ADMIN — POTASSIUM CHLORIDE 10 MEQ: 750 TABLET, EXTENDED RELEASE ORAL at 09:21

## 2020-01-20 RX ADMIN — DOCUSATE SODIUM 200 MG: 100 CAPSULE, LIQUID FILLED ORAL at 17:25

## 2020-01-20 RX ADMIN — BENZONATATE 100 MG: 100 CAPSULE ORAL at 21:19

## 2020-01-20 RX ADMIN — LEVALBUTEROL HYDROCHLORIDE 1.25 MG: 1.25 SOLUTION, CONCENTRATE RESPIRATORY (INHALATION) at 16:00

## 2020-01-20 RX ADMIN — HYDROCODONE BITARTRATE AND HOMATROPINE METHYLBROMIDE 5 ML: 5; 1.5 SOLUTION ORAL at 21:16

## 2020-01-20 RX ADMIN — METHYLPREDNISOLONE SODIUM SUCCINATE 60 MG: 125 INJECTION, POWDER, FOR SOLUTION INTRAMUSCULAR; INTRAVENOUS at 17:22

## 2020-01-20 RX ADMIN — BENZONATATE 100 MG: 100 CAPSULE ORAL at 16:29

## 2020-01-20 RX ADMIN — ENOXAPARIN SODIUM 40 MG: 40 INJECTION SUBCUTANEOUS at 09:21

## 2020-01-20 RX ADMIN — ACETAMINOPHEN 650 MG: 325 TABLET ORAL at 07:26

## 2020-01-20 RX ADMIN — FLUTICASONE PROPIONATE 1 SPRAY: 50 SPRAY, METERED NASAL at 09:23

## 2020-01-20 RX ADMIN — IPRATROPIUM BROMIDE 0.5 MG: 0.5 SOLUTION RESPIRATORY (INHALATION) at 07:36

## 2020-01-20 RX ADMIN — DOXYCYCLINE 100 MG: 100 CAPSULE ORAL at 09:21

## 2020-01-20 RX ADMIN — DOXYCYCLINE 100 MG: 100 CAPSULE ORAL at 21:17

## 2020-01-20 RX ADMIN — HYDROCODONE BITARTRATE AND HOMATROPINE METHYLBROMIDE 5 ML: 5; 1.5 SOLUTION ORAL at 16:29

## 2020-01-20 RX ADMIN — LEVETIRACETAM 1500 MG: 500 TABLET, FILM COATED ORAL at 09:19

## 2020-01-20 RX ADMIN — LEVALBUTEROL HYDROCHLORIDE 1.25 MG: 1.25 SOLUTION, CONCENTRATE RESPIRATORY (INHALATION) at 07:36

## 2020-01-20 RX ADMIN — ATORVASTATIN CALCIUM 40 MG: 40 TABLET, FILM COATED ORAL at 09:21

## 2020-01-20 RX ADMIN — LACTULOSE 10 G: 10 SOLUTION ORAL at 09:20

## 2020-01-20 RX ADMIN — CEFTRIAXONE SODIUM 1000 MG: 10 INJECTION, POWDER, FOR SOLUTION INTRAVENOUS at 17:19

## 2020-01-20 RX ADMIN — LEVETIRACETAM 1500 MG: 500 TABLET, FILM COATED ORAL at 17:25

## 2020-01-20 RX ADMIN — METHYLPREDNISOLONE SODIUM SUCCINATE 60 MG: 125 INJECTION, POWDER, FOR SOLUTION INTRAMUSCULAR; INTRAVENOUS at 06:06

## 2020-01-20 RX ADMIN — LISINOPRIL 10 MG: 10 TABLET ORAL at 09:21

## 2020-01-20 RX ADMIN — ZOLPIDEM TARTRATE 5 MG: 5 TABLET, FILM COATED ORAL at 21:33

## 2020-01-20 RX ADMIN — BENZONATATE 100 MG: 100 CAPSULE ORAL at 09:31

## 2020-01-20 RX ADMIN — IPRATROPIUM BROMIDE 0.5 MG: 0.5 SOLUTION RESPIRATORY (INHALATION) at 13:00

## 2020-01-20 RX ADMIN — MONTELUKAST SODIUM 10 MG: 10 TABLET, FILM COATED ORAL at 09:21

## 2020-01-20 RX ADMIN — LEVALBUTEROL HYDROCHLORIDE 1.25 MG: 1.25 SOLUTION, CONCENTRATE RESPIRATORY (INHALATION) at 13:00

## 2020-01-20 RX ADMIN — IPRATROPIUM BROMIDE 0.5 MG: 0.5 SOLUTION RESPIRATORY (INHALATION) at 19:53

## 2020-01-20 RX ADMIN — ASPIRIN 81 MG 81 MG: 81 TABLET ORAL at 09:21

## 2020-01-20 RX ADMIN — HYDROCODONE BITARTRATE AND HOMATROPINE METHYLBROMIDE 5 ML: 5; 1.5 SOLUTION ORAL at 09:31

## 2020-01-20 RX ADMIN — FOLIC ACID 1 MG: 1 TABLET ORAL at 09:19

## 2020-01-20 RX ADMIN — POLYETHYLENE GLYCOL 3350 17 G: 17 POWDER, FOR SOLUTION ORAL at 09:12

## 2020-01-20 RX ADMIN — DOCUSATE SODIUM 200 MG: 100 CAPSULE, LIQUID FILLED ORAL at 09:21

## 2020-01-20 RX ADMIN — AZITHROMYCIN 500 MG: 250 TABLET, FILM COATED ORAL at 16:30

## 2020-01-20 RX ADMIN — PANTOPRAZOLE SODIUM 40 MG: 40 TABLET, DELAYED RELEASE ORAL at 06:06

## 2020-01-20 NOTE — ASSESSMENT & PLAN NOTE
· Has severe persistent asthma, requiring 7 intubations previously  · Currently with diffuse expiratory wheeze in all lung fields bilaterally  · Currently on Xopenex and Atrovent along with IV Solu-Medrol q 6 hours with minimal improvement  · Will consult pulmonology, continue with current management and reassess in the morning  · Continue with symptom management for cough  · Asthma exacerbation likely secondary to underlying pneumonia, influenza has been treated prior to ED arrival

## 2020-01-20 NOTE — ASSESSMENT & PLAN NOTE
· Confirmed by CT a imaging showing right upper lobe consolidation concerning for pneumonia  · On day 3 of IV antibiotics, continue with current management  · Follow-up cultures, continued symptom control

## 2020-01-20 NOTE — PLAN OF CARE
Problem: OCCUPATIONAL THERAPY ADULT  Goal: Performs self-care activities at highest level of function for planned discharge setting  See evaluation for individualized goals  Description  Treatment Interventions: ADL retraining, Functional transfer training, UE strengthening/ROM, Endurance training, Patient/family training, Equipment evaluation/education, Neuromuscular reeducation, Fine motor coordination activities, Compensatory technique education, Continued evaluation, Energy conservation, Activityengagement          See flowsheet documentation for full assessment, interventions and recommendations  Note:   Limitation: Decreased ADL status, Decreased UE ROM, Decreased UE strength, Decreased endurance, Decreased fine motor control, Decreased high-level ADLs, Decreased sensation, Decreased self-care trans, Non-func L UE     Assessment: Pt is a 71 y o  female seen for OT evaluation (time 6308-5138) s/p admit to [facility] on 1/18/2020 w/ Asthma exacerbation, non-allergic, severe persistent  Comorbidities affecting pt's functional performance at time of assessment include: CVA, HTN, influenza A, DM, elevated LFTs, community acquired pneumonia  Evaluation required a review of medical and/ or therapy records and extensive additional review of physical, cognitive, or psychosocial history related to current functional performance    Personal factors affecting pt at time of IE include:steps to enter environment, difficulty performing ADLS, difficulty performing IADLS  and health management   Prior to admission, Pt required A with ADLs, IADLs, and functional mobility with SPC vs RW   Upon evaluation: Based on functional assessment, pt requires Min A for grooming, set-up for feeding, Mod A for UB ADLs, Max A for LB dressing, Mod Ax2 for sit<>stand transfer with RW, Min A x 1 with SBA and chair follow of 2nd assistive person for functional mobility with RW 2* the following deficits impacting occupational performance: weakness, decreased ROM, decreased strength, decreased balance, decreased tolerance, impaired GMC, impaired [address], impaired sensation, abnormal tone, increased pain and decreased coping skills  Cognition appears to be [facility] and vision is impaired - please refer to flowsheet above for details  Pt to benefit from continued skilled OT tx while in the hospital to address deficits as defined above and maximize level of functional independence w ADL's and functional mobility  Occupational Performance areas to address include: eating, grooming, bathing/shower, toilet hygiene, dressing, socialization, health maintenance, functional mobility, meal prep and social participation  From OT standpoint, recommendation at time of d/c would be short term rehab       OT Discharge Recommendation: Short Term Rehab  OT - OK to Discharge: (once medically cleared)

## 2020-01-20 NOTE — CONSULTS
Consultation - [facility]) [facility]  [employer] 71 y o  female MRN: [id number]  Unit/Bed#: -01 Encounter: [id number]         Reason for Consult / Principal Problem: Elevated LFTs    HPI: Dipika Hinson is a 70-year-old female with history of type 2 diabetes, hypertension, COPD, previous CVA, GERD, complicated diverticulitis status post partial colectomy, seizure disorder, and obesity status post sleeve gastrectomy with subsequent conversion to Khang-en-Y bypass last year  Patient also is reporting history of primary sclerosing cholangitis  She is status post cholecystectomy  She reports that she was previously listed for transplant at [facility], but decided to be taken off the list in 2011  Patient is currently hospitalized for community-acquired pneumonia with evidence of patchy consolidation in the right upper lobe on chest CTA  She is currently being treated with ceftriaxone  Prior to admission, she denies signs of overt GI bleeding  She has no abdominal pain  Her LFTs were as follows on admission:  AST 90, ALT 98, alkaline phosphatase 357 and total bilirubin 0 5  She had a right upper quadrant ultrasound revealing slow velocities within the intrahepatic portal vein suspicious for portal venous hypertension  The liver itself with normal contour  Upon reviewing her previous labs, her alkaline phosphatase at baseline is around 150-200  Patient reports that she had an EGD and colonoscopy around 5 years ago with her primary gastroenterologist within the [facility] [address]  She was told that she needed a recall for her colonoscopy  Review of Systems:    CONSTITUTIONAL: Denies any fever, chills, or rigors  Good appetite, and no recent weight loss  HEENT: No earache or tinnitus  Denies hearing loss or visual disturbances  CARDIOVASCULAR: No chest pain or palpitations     RESPIRATORY: Denies any cough, hemoptysis, shortness of breath or dyspnea on exertion  GASTROINTESTINAL: As noted in the History of Present Illness  GENITOURINARY: No problems with urination  Denies any hematuria or dysuria  NEUROLOGIC: No dizziness or vertigo, denies headaches  MUSCULOSKELETAL: Denies any muscle or joint pain  SKIN: Denies skin rashes or itching  ENDOCRINE: Denies excessive thirst  Denies intolerance to heat or cold  PSYCHOSOCIAL: Denies depression or anxiety  Denies any recent memory loss  Historical Information   Past Medical History:   Diagnosis Date    Asthma     Cerebral infarction ([place] [address] )     Diabetes mellitus ([place] [address] )     GERD (gastroesophageal reflux disease)     Hyperlipidemia     Hypertension     Stroke ([place] [address] )     12/19    SVT (supraventricular tachycardia) ([place] [address] )      History reviewed  No pertinent surgical history  Social History   Social History     Substance and Sexual Activity   Alcohol Use Never    Frequency: Never     Social History     Substance and Sexual Activity   Drug Use Not Currently     Social History     Tobacco Use   Smoking Status Never Smoker   Smokeless Tobacco Never Used     History reviewed  No pertinent family history       Meds/Allergies     Current Facility-Administered Medications   Medication Dose Route Frequency    acetaminophen (TYLENOL) tablet 650 mg  650 mg Oral Q6H PRN    acetaminophen (TYLENOL) tablet 650 mg  650 mg Oral Q6H PRN    albuterol inhalation solution 2 5 mg  2 5 mg Nebulization Q6H PRN    aspirin chewable tablet 81 mg  81 mg Oral Daily    atorvastatin (LIPITOR) tablet 40 mg  40 mg Oral Daily    azithromycin (ZITHROMAX) tablet 500 mg  500 mg Oral Q24H    benzonatate (TESSALON PERLES) capsule 100 mg  100 mg Oral TID PRN    cefTRIAXone (ROCEPHIN) 1,000 mg in dextrose 5 % 50 mL IVPB  1,000 mg Intravenous Q24H    dextromethorphan-guaiFENesin (ROBITUSSIN DM)  mg/5 mL oral syrup 10 mL  10 mL Oral Q4H PRN    docusate sodium (COLACE) capsule 200 mg  200 mg Oral BID    doxycycline hyclate (VIBRAMYCIN) capsule 100 mg  100 mg Oral Q12H [facility]    enoxaparin (LOVENOX) subcutaneous injection 40 mg  40 mg Subcutaneous Daily    fluticasone (FLONASE) 50 mcg/act nasal spray 1 spray  1 spray Each Nare Daily    folic acid (FOLVITE) tablet 1 mg  1 mg Oral Daily    HYDROcodone-homatropine (HYCODAN) 5-1 5 mg/5 mL oral syrup 5 mL  5 mL Oral Q4H PRN    insulin lispro (HumaLOG) 100 units/mL subcutaneous injection 1-5 Units  1-5 Units Subcutaneous 4 times day    ipratropium (ATROVENT) 0 02 % inhalation solution 0 5 mg  0 5 mg Nebulization 4x Daily    lactulose 20 g/30 mL oral solution 10 g  10 g Oral Daily    levalbuterol (XOPENEX) inhalation solution 1 25 mg  1 25 mg Nebulization 4x Daily    levETIRAcetam (KEPPRA) tablet 1,500 mg  1,500 mg Oral BID    lisinopril (ZESTRIL) tablet 10 mg  10 mg Oral Daily    methylPREDNISolone sodium succinate (Solu-MEDROL) injection 60 mg  60 mg Intravenous Q6H DOMINGO    montelukast (SINGULAIR) tablet 10 mg  10 mg Oral Daily    ondansetron (ZOFRAN) injection 4 mg  4 mg Intravenous Q4H PRN    pantoprazole (PROTONIX) EC tablet 40 mg  40 mg Oral Early Morning    polyethylene glycol (MIRALAX) packet 17 g  17 g Oral BID    potassium chloride (K-DUR,KLOR-CON) CR tablet 10 mEq  10 mEq Oral BID    sodium chloride 0 9 % infusion  50 mL/hr Intravenous Continuous       Allergies   Allergen Reactions    Ciprofloxacin Rash    Gabapentin Rash    Levaquin [Levofloxacin] Rash    Sulfa Antibiotics Rash         Objective     Blood pressure 123/80, pulse 72, temperature 98 3 °F (36 8 °C), resp  rate 17, height 5' 5" (1 651 m), weight 80 8 kg (178 lb 2 1 oz), SpO2 98 %  Intake/Output Summary (Last 24 hours) at 1/20/2020 1034  Last data filed at 1/20/2020 0607  Gross per 24 hour   Intake 840 ml   Output 600 ml   Net 240 ml         PHYSICAL EXAM:      General Appearance:   Alert and oriented x 3  Cooperative, and in no respiratory distress   HEENT:   Normocephalic, atraumatic, anicteric      Neck:  Supple, symmetrical, trachea midline   Lungs:   Clear to auscultation bilaterally; no rales, rhonchi or wheezing; respirations unlabored    Heart[de-identified]   S1 and S2 normal; regular rate and rhythm; no murmur, rub, or gallop  Abdomen:   Soft, non-tender, non-distended; normal bowel sounds; no masses, no organomegaly    Genitalia:   Deferred    Rectal:   Deferred    Extremities:  No cyanosis, clubbing or edema    Pulses:  2+ and symmetric all extremities    Skin:  Skin color, texture, turgor normal, no rashes or lesions    Lymph nodes:  No palpable cervical or supraclavicular lymphadenopathy        Lab Results:   Results from last 7 days   Lab Units 01/20/20  0607   WBC Thousand/uL 7 99   HEMOGLOBIN g/dL 10 5*   HEMATOCRIT % 33 2*   PLATELETS Thousands/uL 225   NEUTROS PCT % 83*   LYMPHS PCT % 11*   MONOS PCT % 6   EOS PCT % 0     Results from last 7 days   Lab Units 01/20/20  0607   POTASSIUM mmol/L 4 0   CHLORIDE mmol/L 110*   CO2 mmol/L 26   BUN mg/dL 12   CREATININE mg/dL 0 77   CALCIUM mg/dL 8 5   ALK PHOS U/L 263*   ALT U/L 72   AST U/L 48*               Imaging Studies: I have personally reviewed pertinent imaging studies  Xr Chest 2 Views    Result Date: 1/18/2020  Impression: Right upper lobe pneumonia on subsequent CT is not conspicuous on radiograph  Workstation performed: [id number]     Cta Ed Chest Pe Study    Result Date: 1/18/2020  Impression: No pulmonary embolus  Patchy consolidation in the dependent portion of the right upper lobe compatible with pneumonia  The dependent distribution raises the possibility of aspiration  The study was marked in [facility] for immediate notification  Workstation performed: [id number]     Us Right Upper Quadrant With Liver Dopplers    Result Date: 1/19/2020  Impression: Slow velocities within the intrahepatic portal veins, concerning for portal venous hypertension  The study was marked in EPIC for significant notification   Workstation performed: [id number] ASSESSMENT and PLAN:      1) Elevated LFTs in the setting of community-acquired pneumonia and known primary sclerosing cholangitis - Compared to baseline, patient has LFTs were elevated including her AST and ALT  Normally, she has elevation alkaline phosphatase between 150-200  She has had 3 days of treatment for her pneumonia  Her LFTs have improved with her alkaline phosphatase being 263, and the remainder of her LFTs within normal limits  She had a right upper quadrant ultrasound revealing slow velocities within the portal system concerning for portal hypertension  She does not have obvious signs of cirrhosis on ultrasound  Per patient, she is currently on no treatment for her primary sclerosing cholangitis  Likely her LFTs were elevated above baseline secondary to sepsis  She denies any abdominal pain  Unclear if her disease is small or large duct  Fortunately, she is status post cholecystectomy which is appropriate for these patients as they have a higher incidence of gallbladder cancer   - Continue to monitor LFTs  - Obtain PT/INR  - Follow-up with primary gastroenterologist     The patient was seen and examined by Dr Rogerio De Jesus, all enciso medical decisions were made with Dr Rogerio De Jesus  Thank you for allowing us to participate in the care of this pleasant patient  GI will sign off, please re-consult if LFTs do not continue to improve

## 2020-01-20 NOTE — ASSESSMENT & PLAN NOTE
Lab Results   Component Value Date    HGBA1C 5 7 01/19/2020       Recent Labs     01/19/20  1454 01/19/20  1638 01/19/20  2040 01/20/20  0644   POCGLU 107 119 131 114       Blood Sugar Average: Last 72 hrs:  (P) 139 375  Glucose levels with good control, continue with current management

## 2020-01-20 NOTE — PLAN OF CARE
Problem: PHYSICAL THERAPY ADULT  Goal: Performs mobility at highest level of function for planned discharge setting  See evaluation for individualized goals  Description  Treatment/Interventions: Functional transfer training, Therapeutic exercise, Endurance training, Patient/family training, Equipment eval/education, Gait training, Spoke to nursing, OT, LE strengthening/ROM, Elevations, Bed mobility          See flowsheet documentation for full assessment, interventions and recommendations  Note:   Prognosis: Good  Problem List: Decreased strength, Decreased endurance, Impaired balance, Decreased mobility, Impaired sensation, Pain, Impaired vision  Assessment: Pt is a 70 y/o female admitted to [facility] with asthma exacerbation  PT consulted to assess functional mobility and d/c needs  PT eval/treat as well as ambulate patient orders active  Pt's past medical history includes CVA, HTN, influenza A, DM, and community-acquired pneumonia  Prior to her admission, the pt was living with her son in a two-level house, was ambulating with a rollator, and was requiring assistance with ADLs and IADLs  Personal factors: living in a multi-level home with JUDE, reliance on an AD for safe ambulation, and a positive fall history  Pt agreeable to PT eval, verbalized as appropriate per JENNIFER Tirado  Two pt identifiers assessed, pt verbalized full name and   L hand splint donned upon arrival  Pt reporting use of L AFO at home, not currently present  Pt performed all transfers mod A x2  Pt ambulated 15' with min A x1 using RW  Pt's gait unsteady, with L hemiparesis and L knee buckling, but no LOB observed  Pt requiring cueing to facilitate L hip and knee flexion for L foot clearance  Pt performed stand<>pivot to recliner with mod A x2  Upon assessment, pt's physical limitations include pain, decreased strength, decreased endurance, impaired balance, decreased sensation, and decreased mobility   Outcome measures: Barthel 40/100, Modified Carver 4  Pt's clinical presentation is currently unstable due to her multiple co-morbidities, ongoing medical assessment, and abnormal lab values  At this time, pt is recommended for STR to address the above impairments  Pt would benefit from continued PT throughout her hospital stay to facilitate return to PLOF  Barriers to Discharge: Inaccessible home environment, Decreased caregiver support     Recommendation: Short-term skilled PT     PT - OK to Discharge: Yes(when medically cleared; if to STR)    See flowsheet documentation for full assessment

## 2020-01-20 NOTE — PROGRESS NOTES
Progress Note - [facility] 1950, 71 y o  female MRN: [id number]    Unit/Bed#: -01 Encounter: [id number]    Primary Care Provider: No primary care provider on file     Date and time admitted to hospital: 1/18/2020 12:00 PM        Community acquired pneumonia  Assessment & Plan  · Confirmed by CT a imaging showing right upper lobe consolidation concerning for pneumonia  · On day 3 of IV antibiotics, continue with current management  · Follow-up cultures, continued symptom control    Elevated LFTs  Assessment & Plan  · Status post right upper quadrant ultra sound showing potential portal venous hypertension  · Patient is asymptomatic and finding is incidental  · Does not appear to warrant inpatient intervention, will continue monitor with daily LFTs    Diabetes mellitus [facility])  Assessment & Plan  Lab Results   Component Value Date    HGBA1C 5 7 01/19/2020       Recent Labs     01/19/20  1454 01/19/20  1638 01/19/20  2040 01/20/20  0644   POCGLU 107 119 131 114       Blood Sugar Average: Last 72 hrs:  (P) 139 375  Glucose levels with good control, continue with current management    Influenza A  Assessment & Plan  · Status post treatment with Tamiflu as an outpatient  · No indication further treatment at this time  · Has superimposed bacterial pneumonia and right upper lobe  · Continue with antibiotics for community-acquired pneumonia      Hypertension  Assessment & Plan  · Blood pressure relatively well controlled, continue with home dose lisinopril 10 mg daily    CVA (cerebral vascular accident) ([address] )  Assessment & Plan  · At previous ischemic CVA back in 12/2017, status post rehab  · Still has residual left-sided hemiparesis  · Continue with high-intensity statin, blood pressure control  · Will continue with PT and OT along with fall precaution    * Asthma exacerbation, non-allergic, severe persistent  Assessment & Plan  · Has severe persistent asthma, requiring 7 intubations previously  · Currently with diffuse expiratory wheeze in all lung fields bilaterally  · Currently on Xopenex and Atrovent along with IV Solu-Medrol q 6 hours with minimal improvement  · Will consult pulmonology, continue with current management and reassess in the morning  · Continue with symptom management for cough  · Asthma exacerbation likely secondary to underlying pneumonia, influenza has been treated prior to ED arrival          VTE Pharmacologic Prophylaxis:   Pharmacologic: Enoxaparin (Lovenox)  Mechanical VTE Prophylaxis in Place: Yes    Patient Centered Rounds: I have performed bedside rounds with nursing staff today  Discussions with Specialists or Other Care Team Provider: pulmonology    Education and Discussions with Family / Patient:  Asthma exacerbation, pneumonia    Time Spent for Care: 45 minutes  More than 50% of total time spent on counseling and coordination of care as described above  Current Length of Stay: 2 day(s)    Current Patient Status: Inpatient   Certification Statement: The patient will continue to require additional inpatient hospital stay due to See above    Discharge Plan: To be determined    Code Status: Level 1 - Full Code      Subjective:   Patient feels about the same as yesterday, still experiencing severe cough and expiratory wheeze along with shortness of breath    Objective:     Vitals:   Temp (24hrs), Av 3 °F (36 8 °C), Min:98 1 °F (36 7 °C), Max:98 6 °F (37 °C)    Temp:  [98 1 °F (36 7 °C)-98 6 °F (37 °C)] 98 3 °F (36 8 °C)  HR:  [62-79] 72  Resp:  [17] 17  BP: (123-151)/(80-86) 123/80  SpO2:  [97 %-100 %] 98 %  Body mass index is 29 64 kg/m²  Input and Output Summary (last 24 hours): Intake/Output Summary (Last 24 hours) at 2020 0918  Last data filed at 2020 0607  Gross per 24 hour   Intake 840 ml   Output 600 ml   Net 240 ml       Physical Exam:     Physical Exam   Constitutional: She is oriented to person, place, and time   She appears well-developed and well-nourished  She appears distressed  Cardiovascular: Normal rate and regular rhythm  Pulmonary/Chest:   Poor inspiratory effort, severe diffuse expiratory wheeze in all lung fields bilaterally, tachypnea   Abdominal: Soft  Bowel sounds are normal    Neurological: She is alert and oriented to person, place, and time  Skin: Skin is warm and dry  Psychiatric: She has a normal mood and affect  Her behavior is normal          Additional Data:     Labs:    Results from last 7 days   Lab Units 01/20/20  0607   WBC Thousand/uL 7 99   HEMOGLOBIN g/dL 10 5*   HEMATOCRIT % 33 2*   PLATELETS Thousands/uL 225   NEUTROS PCT % 83*   LYMPHS PCT % 11*   MONOS PCT % 6   EOS PCT % 0     Results from last 7 days   Lab Units 01/20/20  0607 01/19/20  0527   SODIUM mmol/L 143 144   POTASSIUM mmol/L 4 0 3 6   CHLORIDE mmol/L 110* 111*   CO2 mmol/L 26 24   BUN mg/dL 12 9   CREATININE mg/dL 0 77 0 63   ANION GAP mmol/L 7 9   CALCIUM mg/dL 8 5 8 4   ALBUMIN g/dL 2 3* 2 4*   TOTAL BILIRUBIN mg/dL  --  0 30   ALK PHOS U/L  --  302*   ALT U/L  --  87*   AST U/L  --  72*   GLUCOSE RANDOM mg/dL 127 142*         Results from last 7 days   Lab Units 01/20/20  0644 01/19/20  2040 01/19/20  1638 01/19/20  1454 01/19/20  1143 01/19/20  0604 01/18/20  2109 01/18/20  1756   POC GLUCOSE mg/dl 114 131 119 107 138 142* 166* 198*     Results from last 7 days   Lab Units 01/19/20  0538   HEMOGLOBIN A1C % 5 7     Results from last 7 days   Lab Units 01/18/20  1901 01/18/20  1241   LACTIC ACID mmol/L  --  1 4   PROCALCITONIN ng/ml 0 06  --            * I Have Reviewed All Lab Data Listed Above  * Additional Pertinent Lab Tests Reviewed: [facility] 66 Admission Reviewed    Imaging:    Imaging Reports Reviewed Today Include: NA  Imaging Personally Reviewed by Myself Includes:  NA    Recent Cultures (last 7 days):     Results from last 7 days   Lab Units 01/18/20  1257 01/18/20  1241   BLOOD CULTURE  No Growth at 24 hrs   No Growth at 24 hrs        Last 24 Hours Medication List:     Current Facility-Administered Medications:  acetaminophen 650 mg Oral Q6H PRN [facility]    acetaminophen 650 mg Oral Q6H PRN [facility]    albuterol 2 5 mg Nebulization Q6H PRN Dayne Whiting MD    aspirin 81 mg Oral Daily [facility]    atorvastatin 40 mg Oral Daily [facility]    azithromycin 500 mg Oral Q24H [facility]    benzonatate 100 mg Oral TID PRN [facility]    cefTRIAXone 1,000 mg Intravenous Q24H [facility] Ruzi Last Rate: 1,000 mg (01/19/20 1733)   dextromethorphan-guaiFENesin 10 mL Oral Q4H PRN [facility]    docusate sodium 200 mg Oral BID [facility]    doxycycline hyclate 100 mg Oral Q12H DOMINGO [facility]    enoxaparin 40 mg Subcutaneous Daily [facility]    fluticasone 1 spray Each Nare Daily Dayne Whiting MD    folic acid 1 mg Oral Daily [facility]    HYDROcodone-homatropine 5 mL Oral Q4H PRN Dayne Whiting MD    insulin lispro 1-5 Units Subcutaneous 4 times day [facility]    ipratropium 0 5 mg Nebulization 4x Daily Dayne Whiting MD    lactulose 10 g Oral Daily [facility]    levalbuterol 1 25 mg Nebulization 4x Daily Dayne Whiting MD    levETIRAcetam 1,500 mg Oral BID [facility]    lisinopril 10 mg Oral Daily [facility]    methylPREDNISolone sodium succinate 60 mg Intravenous Q6H [facility]    montelukast 10 mg Oral Daily [facility]    ondansetron 4 mg Intravenous Q4H PRN Dayne Whiting MD    pantoprazole 40 mg Oral Early Morning [facility]    polyethylene glycol 17 g Oral BID [facility]    potassium chloride 10 mEq Oral BID [facility]    sodium chloride 50 mL/hr Intravenous Continuous [facility] Ruzi Last Rate: 50 mL/hr (01/18/20 1723)        Today, Patient Was Seen By: DEMAR Mendoza      ** Please Note: Dictation voice to text software may have been used in the creation of this document   **

## 2020-01-20 NOTE — OCCUPATIONAL THERAPY NOTE
[address]  Evaluation     Patient Name: Ron HOOVER Date: 1/20/2020  Problem List  Principal Problem:    Asthma exacerbation, non-allergic, severe persistent  Active Problems:    CVA (cerebral vascular accident) ([facility] )    Hypertension    Influenza A    Diabetes mellitus ([facility] )    Elevated LFTs    Community acquired pneumonia    Past Medical History  Past Medical History:   Diagnosis Date    Asthma     Cerebral infarction ([facility] )     Diabetes mellitus ([facility] )     GERD (gastroesophageal reflux disease)     Hyperlipidemia     Hypertension     Stroke ([place] [address] )     12/19    SVT [facility]supra[facility]ricular tachycardia) ([facility])      Past Surgical History  History reviewed  No pertinent surgical history  01/20/20 1054   Note Type   Note type Eval only   Restrictions/Precautions   Weight Bearing Precautions Per Order No   Braces or Orthoses Splint;AFO  (L UE resting hand splint; L LE AFO for foot drop)   Other Precautions Droplet precautions; Chair Alarm; Bed Alarm;Multiple lines; Fall Risk;Pain;Hard of hearing  (hx of seizures; back safety precautions)   Pain Assessment   Pain Assessment 0-10   Pain Score 7   Pain Type Acute pain   Pain Location Head   Pain Descriptors Aching;Headache   Hospital Pain Intervention(s) Ambulation/increased activity; Emotional support;Repositioned  (JENNIFER Fermin aware)   Response to Interventions Tolerated   Home Living   Type of [facility] to live on main level with bedroom/bathroom; Performs ADLs on one level;1/2 bath on main level; Two level;Stairs to enter with rails  (5-6 JUDE)   Bathroom Shower/Tub Tub/shower unit   Bathroom Toilet Standard   Bathroom Equipment Shower chair   Bathroom Accessibility Accessible   Home Equipment Walker;Cane  (Prior to CVA used RW; was learning to use SPC at [address])   Additional Comments Pt recently went to [address] for CVA she had this past December 2019   After acute rehab, pt reports she went to subacute rehab for <12 hrs, then returned home with her son  Pt has been living with her son for 1 wk  Prior Function   Level of [place] Needs assistance with ADLs and functional mobility; Needs assistance with IADLs   Lives With Son  (dtr in law; grandchildren)   Emiliano Help From Family   ADL Assistance Needs assistance   IADLs Needs assistance   Falls in the last 6 months 1 to 4  (2 falls from LOB)   Vocational On disability   Comments Pt reports son has been walking with pt <> bathroom and up and down steps to assist with showering  Lifestyle   Autonomy Pt required A with ADLs, IADLs, and functional mobility with SPC vs RW   Reciprocal Relationships Pt has supportive son who assists pt with ADLs and IADLs  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   Intrinsic Gratification Pt enjoys going out of the hosue   Psychosocial   Psychosocial (WDL) WDL   Subjective   Subjective Pt is pleasant and cooperative   ADL   Eating Assistance 5  Supervision/Setup   Eating Deficit Increased time to complete;Setup   Grooming Assistance 4  Minimal Assistance   UB Bathing Assistance 3  Moderate Assistance   LB Bathing Assistance 2  Maximal Assistance   UB Dressing Assistance 3  Moderate Assistance   LB Dressing Assistance 2  Maximal [address]  3  Moderate Assistance   Additional Comments Assist levels listed above are based on functional assessment of performance skills and deficits  Bed Mobility   Additional Comments Pt seated in b/s chair at start of session and agreeable to OT  Pt seen with PT due to decreased activity tolerance  Pt seated in b/s chair at end of session with all needs met, call bell within reach, and chair alarm active  Transfers   Sit to Stand 3  Moderate assistance   Additional items Assist x 2;Armrests; Increased time required   Stand to Sit 3  Moderate assistance   Additional items Assist x 2;Armrests; Increased time required   Stand pivot 3  Moderate assistance   Additional items Assist x 2;Armrests; Increased time required;Verbal cues   Toilet transfer 3  Moderate assistance   Additional items Assist x 2; Increased time required;Verbal cues;Armrests  (bsc frame over toilet)   Additional Comments Pt used RW for UE support  Pt required A to control descent  Functional Mobility   Functional Mobility 4  Minimal assistance   Additional Comments Min Ax 1 using RW with SBA of 2nd and chair follow to complete functional mobility <> bathroom  Pt required VCs for safe foot placement (2nd to foot drop L LE)  During return from bathroom after completion of commode transfer, pt stated "I feel like my right leg is going to give out"  Pt provided with immediate seated rest break  Pt then completed SPT from commode seat to b/s recliner placed outside bathroom door entrance  Pt denied SOB/dizziness/lightheadedness  Balance   Static Sitting Fair +   Dynamic Sitting Fair -   Static Standing Poor +   Dynamic Standing Poor +   Ambulatory Poor +   Activity Tolerance   Activity Tolerance Patient limited by fatigue;Patient limited by pain   Medical Staff Made Aware PT Maria Eugenia, SPT Sohail Caputo SPT Katie   Nurse Made Aware JENNIFER Lim verbalized pt appropriate for OT   Notified of outcomes   RUE Assessment   RUE Assessment WFL  (R handed)   LUE Assessment   LUE Assessment X   LUE Overall AROM   L Shoulder Flexion <45* flexion   L Elbow Flexion <90* flexion   L Wrist Flexion pt positioned in resting hand splint due to hypertonicity (pt reports her hand was previously contracted in a fist)   L Mass Grasp impaired; pt reports being unable to flex her digits   LUE Overall PROM   L Shoulder Flexion <60* flexion   LUE Tone   LUE Tone Hypotonic   Hand Function   Gross Motor Coordination Impaired  (R side functional ; L side impaired)   Fine Motor Coordination Impaired  (R side functional ; L side impaired)   Sensation   Additional Comments pt has neuropathy in b/l feet   Vision-Basic Assessment   Current Vision Wears glasses only for reading   Patient Visual Report   (L eye blurry; decreased peripheral vision L eye (half))   Vision - Complex Assessment   Acuity Able to read clock/calendar on wall without difficulty   Additional Comments Reports difficulty reading the menu   Cognition   Overall Cognitive Status [facility]   Arousal/Participation Alert; Responsive;Arousable; Cooperative   Attention Within functional limits   Orientation Level Oriented X4   Memory Within functional limits   Following Commands Follows all commands and directions without difficulty   Comments Pt able to identify self by full name and birthdate   Assessment   Limitation Decreased ADL status; Decreased UE ROM; Decreased UE strength;Decreased endurance;Decreased fine motor control;Decreased high-level ADLs; Decreased sensation;Decreased self-care trans; Non-func L UE   Assessment Pt is a 71 y o  female seen for OT evaluation (time 2433-8744) s/p admit to [facility] on 1/18/2020 w/ Asthma exacerbation, non-allergic, severe persistent  Comorbidities affecting pt's functional performance at time of assessment include: CVA, HTN, influenza A, DM, elevated LFTs, community acquired pneumonia  Evaluation required a review of medical and/ or therapy records and extensive additional review of physical, cognitive, or psychosocial history related to current functional performance    Personal factors affecting pt at time of IE include:steps to enter environment, difficulty performing ADLS, difficulty performing IADLS  and health management   Prior to admission, Pt required A with ADLs, IADLs, and functional mobility with SPC vs RW   Upon evaluation: Based on functional assessment, pt requires Min A for grooming, set-up for feeding, Mod A for UB ADLs, Max A for LB dressing, Mod Ax2 for sit<>stand transfer with RW, Min A x 1 with SBA and chair follow of 2nd assistive person for functional mobility with RW 2* the following deficits impacting occupational performance: weakness, decreased ROM, decreased strength, decreased balance, decreased tolerance, impaired [facility], impaired [facility], impaired sensation, abnormal tone, increased pain and decreased coping skills  Cognition appears to be [facility] and vision is impaired - please refer to flowsheet above for details  Pt to benefit from continued skilled OT tx while in the hospital to address deficits as defined above and maximize level of functional independence w ADL's and functional mobility  Occupational Performance areas to address include: eating, grooming, bathing/shower, toilet hygiene, dressing, socialization, health maintenance, functional mobility, meal prep and social participation  From OT standpoint, recommendation at time of d/c would be short term rehab  Goals   Patient Goals "to go home and cook"   Plan   Treatment Interventions ADL retraining;Functional transfer training;UE strengthening/ROM; Endurance training;Patient/family training;Equipment evaluation/education; Neuromuscular reeducation; Fine motor coordination activities; Compensatory technique education;Continued evaluation; Energy conservation; Activityengagement   Goal Expiration Date 01/30/20   OT Frequency 3-5x/wk   Recommendation   OT Discharge Recommendation Short Term Rehab   OT - OK to Discharge   (once medically cleared)   Barthel Index   Feeding 5   Bathing 0   Grooming Score 0   Dressing Score 5   Bladder Score 10   Bowels Score 10   Toilet Use Score 5   Transfers (Bed/Chair) Score 5   Mobility (Level Surface) Score 0   Stairs Score 0   Barthel Index Score 40   Modified Mobile Scale   Modified Anjel Scale 4     Goals to be met within 10 days:    Pt will perform self feeding Mod I with use of most appropriate adaptive and compensatory techniques  Pt will complete grooming/oral hygiene tasks Mod I while seated at sink with use of most appropriate adaptive and compensatory techniques  Pt will complete UB bathing/dressing Mod I while seated  with use of most appropriate adaptive and compensatory techniques      Pt will complete LB bathing/dressing with Min A while seated with use of most appropriate adaptive and compensatory techniques  Pt will improve functional activity tolerance while standing at sink to 10+ minutes in order to increase safety and independence during functional transfers and ADL tasks  Pt will improve dynamic sitting/standing balance to good to increase safety and independence during functional transfers and ADL and decrease risk for falls  Pt will increase independence during STS transfers with least restrictive AD Mod I     Pt will complete transfer to raised toilet with grab bars with with supervision after set-up     Pt will complete toileting tasks (clothing management up/down, hygiene) with supervision after set-up     Pt will complete shower stall transfer  without LOB with with Min A using shower chair    Pt will identify and implement at least 3 healthy coping strategies to increase participation in meaningful activities and decrease risk for readmission  Pt will perform light meal prep task with supervision while seated with use of most appropriate adaptive and compensatory techniques      Eve Real, OTR/L

## 2020-01-20 NOTE — ASSESSMENT & PLAN NOTE
· Status post right upper quadrant ultra sound showing potential portal venous hypertension  · Patient is asymptomatic and finding is incidental  · Does not appear to warrant inpatient intervention, will continue monitor with daily LFTs

## 2020-01-20 NOTE — CONSULTS
Consultation - Pulmonary Medicine   Carlos A Berman 71 y o  female MRN: [id number]  Unit/Bed#: -01 Encounter: [id number]      Assessment:  Post viral pneumonia involving the right upper lobe- likely Staphylococcus  Chronic obstructive pulmonary disease  Chronic bronchitis  Ischemic encephalopathy with left hemiparesis  Essential hypertension  Hyperlipidemia  Type 2 diabetes mellitus without complication  Hypochromic microcytic anemia  Mild protein calorie malnutrition    The pneumonia developed shortly after a diagnosis of influenza A was established and treated with Tamiflu  Post viral pneumonia is often times associated with staphylococcal infection    Plan:   Supplemental oxygen to maintain saturation greater than 92%  Aspiration precautions  Intravenous antibiotics- narrow the spectrum of antibiotic coverage  Bronchodilator therapy  Mucolytic therapy  Chest x-ray follow-up  Steroid taper  DVT prophylaxis  Glucose control  Nutritional and vitamin supplement therapy            History of Present Illness   Physician Requesting Consult: Gilda Burnett MD  Reason for Consult / Principal Problem:  Pneumonia  Hx and PE limited by:  None  HPI: Carlos A Berman is a 71y o  year old female who presents with cough    This is a 68-year-old  female with past medical history of chronic obstructive pulmonary disease, diabetes mellitus, and ischemic encephalopathy who presents to [facility] with shortness of breath and cough  She has not felt well since David when she was diagnosed with influenza A  She did not have significant relief with Tamiflu  She has had generalized malaise and fatigue for several weeks  She developed cough productive of yellow green sputum several days ago  There has been shortness of breath with wheezing and chest tightness  She denies having any fever, chills, night sweats  There is no chest pain, palpitations, or diaphoresis    Appetite has been less than optimal     Radiographic studies at the time of presentation revealed right upper lobe consolidation  Pulmonary consultation is called at this time for further evaluation  Her COPD/asthma has been well controlled with DuoNeb therapy p r n , Advair b i d , and supplemental oxygen throughout the night  She is a former cigarette smoker who quit in 1987  There is no history of drug or alcohol abuse  She is retired   There is no recent travel  She denies exposure to chemicals, toxins, or asbestos  Consults    Review of Systems   Constitutional: Positive for activity change and fatigue  Negative for appetite change, chills, diaphoresis, fever and unexpected weight change  HENT: Positive for congestion  Negative for dental problem, drooling, ear discharge, ear pain, facial swelling, hearing loss, mouth sores, nosebleeds, postnasal drip, rhinorrhea, sinus pressure, sinus pain, sneezing, sore throat, tinnitus, trouble swallowing and voice change  Eyes: Negative for photophobia, pain, discharge, redness, itching and visual disturbance  Respiratory: Positive for cough, shortness of breath and wheezing  Negative for apnea, choking, chest tightness and stridor  Cardiovascular: Negative for chest pain, palpitations and leg swelling  Gastrointestinal: Negative for abdominal distention, abdominal pain, anal bleeding, blood in stool, constipation, diarrhea, nausea, rectal pain and vomiting  Endocrine: Negative for cold intolerance, heat intolerance, polydipsia, polyphagia and polyuria  Genitourinary: Negative  Musculoskeletal: Negative for arthralgias, back pain, gait problem, joint swelling, myalgias, neck pain and neck stiffness  Allergic/Immunologic: Negative for environmental allergies, food allergies and immunocompromised state     Neurological: Negative for dizziness, tremors, seizures, syncope, facial asymmetry, speech difficulty, weakness, light-headedness, numbness and headaches  Hematological: Negative for adenopathy  Does not bruise/bleed easily  Psychiatric/Behavioral: Negative for agitation, behavioral problems, confusion, decreased concentration, dysphoric mood, hallucinations, self-injury, sleep disturbance and suicidal ideas  The patient is not nervous/anxious and is not hyperactive  Historical Information   Past Medical History:   Diagnosis Date    Asthma     Cerebral infarction ([place] [address] )     Diabetes mellitus ([place] [address] )     GERD (gastroesophageal reflux disease)     Hyperlipidemia     Hypertension     Stroke ([place] [address] )     12/19    SVT (supraventricular tachycardia) ([place] [address] )      History reviewed  No pertinent surgical history  Social History   Social History     Substance and Sexual Activity   Alcohol Use Never    Frequency: Never     Social History     Substance and Sexual Activity   Drug Use Not Currently     Social History     Tobacco Use   Smoking Status Never Smoker   Smokeless Tobacco Never Used     Occupational History:  Retired     Family History: History reviewed  No pertinent family history      Meds/Allergies   current meds:   Current Facility-Administered Medications   Medication Dose Route Frequency    acetaminophen (TYLENOL) tablet 650 mg  650 mg Oral Q6H PRN    acetaminophen (TYLENOL) tablet 650 mg  650 mg Oral Q6H PRN    albuterol inhalation solution 2 5 mg  2 5 mg Nebulization Q6H PRN    aspirin chewable tablet 81 mg  81 mg Oral Daily    atorvastatin (LIPITOR) tablet 40 mg  40 mg Oral Daily    azithromycin (ZITHROMAX) tablet 500 mg  500 mg Oral Q24H    benzonatate (TESSALON PERLES) capsule 100 mg  100 mg Oral TID PRN    cefTRIAXone (ROCEPHIN) 1,000 mg in dextrose 5 % 50 mL IVPB  1,000 mg Intravenous Q24H    dextromethorphan-guaiFENesin (ROBITUSSIN DM)  mg/5 mL oral syrup 10 mL  10 mL Oral Q4H PRN    docusate sodium (COLACE) capsule 200 mg  200 mg Oral BID    doxycycline hyclate (VIBRAMYCIN) capsule 100 mg  100 mg Oral Q12H [facility]    enoxaparin (LOVENOX) subcutaneous injection 40 mg  40 mg Subcutaneous Daily    fluticasone (FLONASE) 50 mcg/act nasal spray 1 spray  1 spray Each Nare Daily    folic acid (FOLVITE) tablet 1 mg  1 mg Oral Daily    HYDROcodone-homatropine (HYCODAN) 5-1 5 mg/5 mL oral syrup 5 mL  5 mL Oral Q4H PRN    insulin lispro (HumaLOG) 100 units/mL subcutaneous injection 1-5 Units  1-5 Units Subcutaneous 4 times day    ipratropium (ATROVENT) 0 02 % inhalation solution 0 5 mg  0 5 mg Nebulization 4x Daily    lactulose 20 g/30 mL oral solution 10 g  10 g Oral Daily    levalbuterol (XOPENEX) inhalation solution 1 25 mg  1 25 mg Nebulization 4x Daily    levETIRAcetam (KEPPRA) tablet 1,500 mg  1,500 mg Oral BID    lisinopril (ZESTRIL) tablet 10 mg  10 mg Oral Daily    methylPREDNISolone sodium succinate (Solu-MEDROL) injection 60 mg  60 mg Intravenous Q6H DOMING[place]    montelukast (SINGULAIR) tablet 10 mg  10 mg Oral Daily    ondansetron (ZOFRAN) injection 4 mg  4 mg Intravenous Q4H PRN    pantoprazole (PROTONIX) EC tablet 40 mg  40 mg Oral Early Morning    polyethylene glycol (MIRALAX) packet 17 g  17 g Oral BID    potassium chloride (K-DUR,KLOR-CON) CR tablet 10 mEq  10 mEq Oral BID    sodium chloride 0 9 % infusion  50 mL/hr Intravenous Continuous       Allergies   Allergen Reactions    Ciprofloxacin Rash    Gabapentin Rash    Levaquin [Levofloxacin] Rash    Sulfa Antibiotics Rash       Objective   Vitals: Blood pressure 123/80, pulse 72, temperature 98 3 °F (36 8 °C), resp  rate 17, height 5' 5" (1 651 m), weight 80 8 kg (178 lb 2 1 oz), SpO2 98 %  ,Body mass index is 29 64 kg/m²      Intake/Output Summary (Last 24 hours) at 1/20/2020 1158  Last data filed at 1/20/2020 6870  Gross per 24 hour   Intake 840 ml   Output 600 ml   Net 240 ml     Invasive Devices     Central Venous Catheter Line            Port A Cath Right Chest -- days          Peripheral Intravenous Line            Peripheral IV 01/18/20 Right Forearm 1 day                Physical Exam   Constitutional: She is oriented to person, place, and time  She appears well-developed and well-nourished  No distress  HENT:   Head: Normocephalic and atraumatic  Right Ear: External ear normal    Left Ear: External ear normal    Nose: Nose normal    Mouth/Throat: Oropharynx is clear and moist  No oropharyngeal exudate  Eyes: Pupils are equal, round, and reactive to light  Conjunctivae are normal  Right eye exhibits no discharge  Left eye exhibits no discharge  No scleral icterus  Neck: Neck supple  No JVD present  No tracheal deviation present  No thyromegaly present  Cardiovascular: Normal rate, regular rhythm, normal heart sounds and intact distal pulses  Exam reveals no gallop and no friction rub  No murmur heard  Pulmonary/Chest: Effort normal  No stridor  No respiratory distress  She has no rales  She exhibits no tenderness  There is scattered and expiratory rhonchi bilaterally   Abdominal: Soft  Bowel sounds are normal  She exhibits no distension and no mass  There is no tenderness  There is no rebound and no guarding  No hernia  Musculoskeletal: Normal range of motion  She exhibits edema  She exhibits no tenderness or deformity  Lymphadenopathy:     She has no cervical adenopathy  Neurological: She is alert and oriented to person, place, and time  No cranial nerve deficit  Dense left hemiparesis   Skin: Skin is warm and dry  No rash noted  She is not diaphoretic  No erythema  No pallor  Psychiatric: She has a normal mood and affect  Her behavior is normal  Judgment and thought content normal    Nursing note and vitals reviewed  Lab Results: I have personally reviewed pertinent lab results       WBC 4 31 - 10 16 Thousand/uL 7 99  3 24Low     RBC 3 81 - 5 12 Million/uL 4 50  4 56    Hemoglobin 11 5 - 15 4 g/dL 10 5Low   10 6Low     Hematocrit 34 8 - 46 1 % 33 2Low   33 6Low     MCV 82 - 98 fL 74Low   74Low     MCH 26 8 - 34 3 pg 23 3Low   23 2Low     MCHC 31 4 - 37 4 g/dL 31 6  31 5    RDW 11 6 - 15 1 % 15  6High   15  2High     MPV 8 9 - 12 7 fL 10 1  9 5    Platelets 406 - 036 Thousands/uL 225  212      Albumin 3 5 - 5 0 g/dL 2 3Low     Calcium 8 3 - 10 1 mg/dL 8 5    Phosphorus 2 3 - 4 1 mg/dL 2 8    Glucose 65 - 140 mg/dL 127       BUN 5 - 25 mg/dL 12    Creatinine 0 60 - 1 30 mg/dL 0 77    Comment: Standardized to IDMS reference method   Sodium 136 - 145 mmol/L 143    Potassium 3 5 - 5 3 mmol/L 4 0    Chloride 100 - 108 mmol/L 110High     CO2 21 - 32 mmol/L 26    ANION GAP 4 - 13 mmol/L 7    eGFR ml/min/1 73sq m 79      Total Bilirubin 0 20 - 1 00 mg/dL 0 30    Bilirubin, Direct 0 00 - 0 20 mg/dL 0 09    Alkaline Phosphatase 46 - 116 U/L 263High     AST 5 - 45 U/L 48High        ALT 12 - 78 U/L 72       Total Protein 6 4 - 8 2 g/dL 6 1Low     Albumin 3 5 - 5 0 g/dL 2 4Low           Imaging Studies: I have personally reviewed pertinent films in PACS     CTA - CHEST WITH IV CONTRAST - PULMONARY ANGIOGRAM     INDICATION:   Shortness of breath  Cough, runny nose, malaise, and fatigue  One episode of fever  History of asthma and achalasia      COMPARISON: Chest radiograph from 1/18/2020      TECHNIQUE: CTA examination of the chest was performed using angiographic technique according to a protocol specifically tailored to evaluate for pulmonary embolism  Axial, sagittal, and coronal 2D reformatted images were created from the source data and   submitted for interpretation  In addition, coronal 3D MIP postprocessing was performed on the acquisition scanner        Radiation dose length product (DLP) for this visit:  398 mGy-cm     This examination, like all CT scans performed in the [facility], was performed utilizing techniques to minimize radiation dose exposure, including the use of iterative   reconstruction and automated exposure control      IV Contrast:  100 mL of iohexol (OMNIPAQUE)     FINDINGS:     PULMONARY ARTERIAL TREE:  No pulmonary embolus is seen       LUNGS:  Patchy consolidation in the dependent portion of the right upper lobe      No significant filling defects in the trachea and bronchi      PLEURA:  Unremarkable      HEART/GREAT VESSELS:  Unremarkable for patient's age      MEDIASTINUM AND SHERRI:  Mild dilation of the lower esophagus  Right port at the cavoatrial junction      CHEST WALL AND LOWER NECK:   Subcentimeter nodule in the left lobe of the thyroid gland  No follow-up is needed      VISUALIZED STRUCTURES IN THE UPPER ABDOMEN:  Cholecystectomy  Gastric bypass  Nonobstructing left renal calcification      OSSEOUS STRUCTURES:  Mild degenerative disease in the spine  Cervicothoracic fusion      IMPRESSION:     No pulmonary embolus      Patchy consolidation in the dependent portion of the right upper lobe compatible with pneumonia  The dependent distribution raises the possibility of aspiration                      EKG, Pathology, and Other Studies: I have personally reviewed pertinent reports  VTE Prophylaxis: Enoxaparin (Lovenox)    Code Status: Level 1 - Full Code  Advance Directive and Living Will:      Power of :    POLST:      Counseling/Coordination of Care: Total floor / unit time spent today 60 minutes  Greater than 50% of total time was spent with the patient and / or family counseling and / or coordination of care  A description of the counseling / coordination of care: The pathophysiology and treatment of pneumonia was discussed in detail  Aspiration precautions will be initiated  She is advised to maintain bronchodilator therapy, however inhaled corticosteroid therapy should be withheld until after the pneumonia has resolved

## 2020-01-20 NOTE — SOCIAL WORK
CM met with pt at bedside  Pt normally lives in a [place] first floor apt with an elevator present  In Dec 2019, she had a CVA and was in [address] [place] [address] x 2 wks  From there, she went to a subacute in [facility], but it was a terrible place and she states that she even served moldy food  Her son Rocael Cardenas removed her from this facility and brought her to his house in [facility]  The house is 2 stories-bedroom is on the first floor, but bathroom is on the 2nd floor and she needs to navigate 14 steps w/railing to reach  She needs assistance from her son or daughter in law [facility] to navigate steps and to assist with ADL's  She has L sided weakness that makes it difficult to use a walker, so currently is using a cane  Denies substance abuse or mental health issues  She quit smoking in 1987  Dr Gerald Coy is her PCP  She uses [facility] in [place] and [employer] in [facility] while staying with her son  Pt does not have a POA or Advanced Directive  CM supplied info on both  Pt does not work or drive  Son or daughter in law transports to appointments  CM discussed d/c needs with pt and also with daughter in law Jose L Nicholson via [phone]  She will speak to  and pt and will let CM know if they are interested in inpatient or outpatient rehab  CM will continue to follow through hospitalization  CM reviewed discharge planning process including the following: identifying help at home, patient preference for discharge planning needs, pharmacy preference, and availability of treatment team to discuss questions or concerns patient and/or family may have regarding understanding medications and recognizing signs and symptoms once discharged  CM also encouraged patient to follow up with all recommended appointments after discharge  Patient advised of importance for patient and family to participate in managing patients medical well being  CM name and role reviewed   Discharge Checklist reviewed and CM will continue to monitor for progress toward discharge goals in nursing and provider rounds

## 2020-01-20 NOTE — PHYSICAL THERAPY NOTE
Physical Therapy Evaluation     Patient's Name: Ramón Workman    Admitting Diagnosis  Bronchospasm [J98 01]  Dyspnea [R06 00]  SOB (shortness of breath) [R06 02]  Influenza B [J10 1]  CVA (cerebral vascular accident) ([facility] ) [I63 9]  Left-sided weakness [R53 1]    Problem List  Patient Active Problem List   Diagnosis    Asthma exacerbation, non-allergic, severe persistent    CVA (cerebral vascular accident) ([place] [address] )    Hypertension    Influenza A    Diabetes mellitus ([place] [address] )    Elevated LFTs    Community acquired pneumonia       Past Medical History  Past Medical History:   Diagnosis Date    Asthma     Cerebral infarction ([place] [address] )     Diabetes mellitus ([place] [address] )     GERD (gastroesophageal reflux disease)     Hyperlipidemia     Hypertension     Stroke ([place] [address] )     12/19    SVT (supraventricular tachycardia) (HCC)        Past Surgical History  History reviewed  No pertinent surgical history  01/20/20 1104   Note Type   Note type Eval only   Pain Assessment   Pain Assessment 0-10   Pain Score 7   Pain Type Acute pain   Pain Location Head   Pain Descriptors Aching;Headache   Hospital Pain Intervention(s) Ambulation/increased activity;Repositioned; Emotional support  (JENNIFER Diaz aware)   Response to Interventions Tolerated   Home Living   Type of [facility] Two level;1/2 bath on main level;Performs ADLs on one level; Able to live on main level with bedroom/bathroom;Stairs to enter with rails  (5-6 JUDE; full flight to bathroom)   Bathroom Shower/Tub Tub/shower unit   Bathroom Toilet Standard   Bathroom Equipment Shower chair   [facility] Walker;Cane;Other (Comment)  (rollator; prior to CVA used , was learning to use [facility])   Additional Comments Pt recently at [facility] s/p CVA in December 2019  Following acute rehab, pt reports going to subacute rehab for < 12 hours, then returned home with her son  Pt has been living with her son for 1 week     Prior Function   Level of [place] Needs assistance with ADLs and functional mobility; Needs assistance with IADLs   Lives With Son  (dtr-in-law; grandchildren)   Receives Help From Family   ADL Assistance Needs assistance   IADLs Needs assistance   Falls in the last 6 months 1 to 4  (2 falls from LOB)   Vocational On disability   Comments Pt reports son has been walking with pt <> bathroom and on stairs to assist with showers  Restrictions/Precautions   Weight Bearing Precautions Per Order No   Braces or Orthoses Splint;AFO  (L UE resting hand splint; L LE AFO for foot drop)   Other Precautions Droplet precautions; Chair Alarm; Bed Alarm;Multiple lines; Fall Risk;Pain;Seizure;Visual impairment  (hx of seizures; back safety precautions)   General   Family/Caregiver Present No   Cognition   Overall Cognitive Status WFL   Arousal/Participation Alert   Attention Within functional limits   Orientation Level Oriented X4   Memory Within functional limits   Following Commands Follows all commands and directions without difficulty   Comments Pt agreeable to PT eval  Pt verbalized full name and   RUE Assessment   RUE Assessment X  (defer to OT eval for comment)   LUE Assessment   LUE Assessment X  (defer to OT eval for comment)   RLE Assessment   RLE Assessment WFL   Strength RLE   R Hip Flexion 3+/5   R Knee Extension 4/5   R Knee Flexion 4-/5   R Ankle Dorsiflexion 4/5   R Ankle Plantar Flexion 4/5   LLE Assessment   LLE Assessment X   Strength LLE   L Hip Flexion 2+/5   L Knee Flexion 3-/5   L Knee Extension 2+/5   L Ankle Dorsiflexion 0/5   L Ankle Plantar Flexion 3+/5   Coordination   Sensation X  (pt reporting chronic neuropathy in B feet)   Light Touch   RLE Light Touch Impaired   LLE Light Touch Impaired   LLE Light Touch Comments pt reporting decrease LT sensation L > R   Bed Mobility   Additional Comments Pt received OOB to recliner upon arrival  PT reports being ambulatory to bathroom using RW with A   Pt returned to recliner at end of session with all needs in reach, chair alarm engaged  Transfers   Sit to Stand 3  Moderate assistance   Additional items Assist x 2;Armrests; Increased time required   Stand to Sit 3  Moderate assistance   Additional items Assist x 2;Armrests; Increased time required   Stand pivot 3  Moderate assistance   Additional items Assist x 2;Armrests; Increased time required;Verbal cues   Toilet transfer 3  Moderate assistance   Additional items Assist x 2; Increased time required;Verbal cues;Armrests  (BSC frame over toilet)   Additional Comments Pt used RW for UE support  Pt required A to control descent  Ambulation/Elevation   Gait pattern L Hemiparesis;L Knee Андрей; Improper Weight shift; Forward Flexion;Decreased foot clearance;Decreased L stance; Short stride; Excessively slow  (L hip hiking for L LE advancement and foot clearance)   Gait Assistance 4  Minimal assist   Additional items Assist x 1;Verbal cues; Tactile cues  (VC's for L hip and knee flexion for L foot clearance)   Assistive Device Rolling walker   Distance 15'   Stair Management Assistance Not tested   Balance   Static Sitting Fair +   Dynamic Sitting Fair -   Static Standing Poor +   Dynamic Standing Poor +   Ambulatory Poor +   Endurance Deficit   Endurance Deficit Yes   Activity Tolerance   Activity Tolerance Patient limited by fatigue   Medical Staff Made Aware PT BABITA [place]   Nurse Made Aware RN Ani Coello verbalized pt appropriate for PT eval; made aware of session outcomes   Assessment   Prognosis Good   Problem List Decreased strength;Decreased endurance; Impaired balance;Decreased mobility; Impaired sensation;Pain; Impaired vision   Assessment Pt is a 72 y/o female admitted to [facility] on 01/18/20 with asthma exacerbation  PT consulted to assess functional mobility and d/c needs  PT eval/treat as well as ambulate patient orders active  Pt's past medical history includes CVA, HTN, influenza A, DM, and community-acquired pneumonia   Prior to her admission, the pt was living with her son in a two-level house, was ambulating with a rollator, and was requiring assistance with ADLs and IADLs  Personal factors: living in a multi-level home with JUDE, reliance on an AD for safe ambulation, and a positive fall history  Pt agreeable to PT eval, verbalized as appropriate per JENNIFER Moses  Two pt identifiers assessed, pt verbalized full name and   L hand splint donned upon arrival  Pt reporting use of L AFO at home, not currently present  Pt performed all transfers mod A x2  Pt ambulated 15' with min A x1 using RW  Pt's gait unsteady, with L hemiparesis and L knee buckling, but no LOB observed  Pt requiring cueing to facilitate L hip and knee flexion for L foot clearance  Pt performed stand<>pivot to recliner with mod A x2  Upon assessment, pt's physical limitations include pain, decreased strength, decreased endurance, impaired balance, decreased sensation, and decreased mobility  Outcome measures: Barthel 40/100, Modified [place] 4  Pt's clinical presentation is currently unstable due to her multiple co-morbidities, ongoing medical assessment, and abnormal lab values  At this time, pt is recommended for STR to address the above impairments  Pt would benefit from continued PT throughout her hospital stay to facilitate return to PLOF     Barriers to Discharge Inaccessible home environment;Decreased caregiver support   Goals   Patient Goals "to go home and cook"   STG Expiration Date 20   Short Term Goal #1 In 7-10 days, pt will: 1) ambulate 76' x 2 with least restrictive device with supervision, 2) perform all transfers with supervision to improve overall mobility, 3) increase B LE strength 1/2 grade to improve ambulation, 4) increase all balance 1 grade to improve safety and independence with functional mobility, 5) Perform all bed mobility tasks with supervision to decrease caregiver burden and 6) Navigate 6 stairs with CGA with unilateral handrail to facilitate return to previous living environment   PT Treatment Day 0   Plan   Treatment/Interventions Functional transfer training; Therapeutic exercise; Endurance training;Patient/family training;Equipment eval/education;Gait training;Spoke to nursing;OT;LE strengthening/ROM; Elevations; Bed mobility   PT Frequency Other (Comment)  (3-5x/wk)   Recommendation   Recommendation Short-term skilled PT   PT - OK to Discharge Yes  (when medically cleared; if to STR)   Modified [place] Scale   Modified [place] Scale 4   Barthel Index   Feeding 5   Bathing 0   Grooming Score 0   Dressing Score 5   Bladder Score 10   Bowels Score 10   Toilet Use Score 5   Transfers (Bed/Chair) Score 5   Mobility (Level Surface) Score 0   Stairs Score 0   B[place] Index Score 40     G[place]smith, SPT

## 2020-01-21 LAB
ALBUMIN SERPL BCP-MCNC: 2.4 G/DL (ref 3.5–5)
ANION GAP SERPL CALCULATED.3IONS-SCNC: 6 MMOL/L (ref 4–13)
BASOPHILS # BLD AUTO: 0 THOUSANDS/ΜL (ref 0–0.1)
BASOPHILS NFR BLD AUTO: 0 % (ref 0–1)
BUN SERPL-MCNC: 13 MG/DL (ref 5–25)
CALCIUM SERPL-MCNC: 8.6 MG/DL (ref 8.3–10.1)
CHLORIDE SERPL-SCNC: 107 MMOL/L (ref 100–108)
CO2 SERPL-SCNC: 29 MMOL/L (ref 21–32)
CREAT SERPL-MCNC: 0.69 MG/DL (ref 0.6–1.3)
EOSINOPHIL # BLD AUTO: 0 THOUSAND/ΜL (ref 0–0.61)
EOSINOPHIL NFR BLD AUTO: 0 % (ref 0–6)
ERYTHROCYTE [DISTWIDTH] IN BLOOD BY AUTOMATED COUNT: 15.4 % (ref 11.6–15.1)
GFR SERPL CREATININE-BSD FRML MDRD: 89 ML/MIN/1.73SQ M
GLUCOSE SERPL-MCNC: 105 MG/DL (ref 65–140)
GLUCOSE SERPL-MCNC: 115 MG/DL (ref 65–140)
GLUCOSE SERPL-MCNC: 135 MG/DL (ref 65–140)
GLUCOSE SERPL-MCNC: 142 MG/DL (ref 65–140)
GLUCOSE SERPL-MCNC: 171 MG/DL (ref 65–140)
GLUCOSE SERPL-MCNC: 185 MG/DL (ref 65–140)
HCT VFR BLD AUTO: 35.3 % (ref 34.8–46.1)
HGB BLD-MCNC: 11.1 G/DL (ref 11.5–15.4)
IMM GRANULOCYTES # BLD AUTO: 0.03 THOUSAND/UL (ref 0–0.2)
IMM GRANULOCYTES NFR BLD AUTO: 1 % (ref 0–2)
LYMPHOCYTES # BLD AUTO: 0.79 THOUSANDS/ΜL (ref 0.6–4.47)
LYMPHOCYTES NFR BLD AUTO: 14 % (ref 14–44)
MAGNESIUM SERPL-MCNC: 1.8 MG/DL (ref 1.6–2.6)
MCH RBC QN AUTO: 23.1 PG (ref 26.8–34.3)
MCHC RBC AUTO-ENTMCNC: 31.4 G/DL (ref 31.4–37.4)
MCV RBC AUTO: 73 FL (ref 82–98)
MONOCYTES # BLD AUTO: 0.29 THOUSAND/ΜL (ref 0.17–1.22)
MONOCYTES NFR BLD AUTO: 5 % (ref 4–12)
NEUTROPHILS # BLD AUTO: 4.72 THOUSANDS/ΜL (ref 1.85–7.62)
NEUTS SEG NFR BLD AUTO: 80 % (ref 43–75)
NRBC BLD AUTO-RTO: 0 /100 WBCS
PHOSPHATE SERPL-MCNC: 2.5 MG/DL (ref 2.3–4.1)
PLATELET # BLD AUTO: 235 THOUSANDS/UL (ref 149–390)
PMV BLD AUTO: 10.3 FL (ref 8.9–12.7)
POTASSIUM SERPL-SCNC: 3.7 MMOL/L (ref 3.5–5.3)
RBC # BLD AUTO: 4.81 MILLION/UL (ref 3.81–5.12)
SODIUM SERPL-SCNC: 142 MMOL/L (ref 136–145)
WBC # BLD AUTO: 5.83 THOUSAND/UL (ref 4.31–10.16)

## 2020-01-21 PROCEDURE — 99232 SBSQ HOSP IP/OBS MODERATE 35: CPT | Performed by: INTERNAL MEDICINE

## 2020-01-21 PROCEDURE — 83735 ASSAY OF MAGNESIUM: CPT | Performed by: STUDENT IN AN ORGANIZED HEALTH CARE EDUCATION/TRAINING PROGRAM

## 2020-01-21 PROCEDURE — 99232 SBSQ HOSP IP/OBS MODERATE 35: CPT | Performed by: FAMILY MEDICINE

## 2020-01-21 PROCEDURE — 94640 AIRWAY INHALATION TREATMENT: CPT

## 2020-01-21 PROCEDURE — 80069 RENAL FUNCTION PANEL: CPT | Performed by: STUDENT IN AN ORGANIZED HEALTH CARE EDUCATION/TRAINING PROGRAM

## 2020-01-21 PROCEDURE — 82948 REAGENT STRIP/BLOOD GLUCOSE: CPT

## 2020-01-21 PROCEDURE — 85025 COMPLETE CBC W/AUTO DIFF WBC: CPT | Performed by: STUDENT IN AN ORGANIZED HEALTH CARE EDUCATION/TRAINING PROGRAM

## 2020-01-21 PROCEDURE — 94760 N-INVAS EAR/PLS OXIMETRY 1: CPT

## 2020-01-21 RX ADMIN — LEVALBUTEROL HYDROCHLORIDE 1.25 MG: 1.25 SOLUTION, CONCENTRATE RESPIRATORY (INHALATION) at 08:01

## 2020-01-21 RX ADMIN — DOXYCYCLINE 100 MG: 100 CAPSULE ORAL at 09:55

## 2020-01-21 RX ADMIN — BENZONATATE 100 MG: 100 CAPSULE ORAL at 06:34

## 2020-01-21 RX ADMIN — ZOLPIDEM TARTRATE 5 MG: 5 TABLET, FILM COATED ORAL at 21:15

## 2020-01-21 RX ADMIN — LEVETIRACETAM 1500 MG: 500 TABLET, FILM COATED ORAL at 09:55

## 2020-01-21 RX ADMIN — IPRATROPIUM BROMIDE 0.5 MG: 0.5 SOLUTION RESPIRATORY (INHALATION) at 11:41

## 2020-01-21 RX ADMIN — DOCUSATE SODIUM 200 MG: 100 CAPSULE, LIQUID FILLED ORAL at 18:23

## 2020-01-21 RX ADMIN — POTASSIUM CHLORIDE 10 MEQ: 750 TABLET, EXTENDED RELEASE ORAL at 18:23

## 2020-01-21 RX ADMIN — IPRATROPIUM BROMIDE 0.5 MG: 0.5 SOLUTION RESPIRATORY (INHALATION) at 08:02

## 2020-01-21 RX ADMIN — PANTOPRAZOLE SODIUM 40 MG: 40 TABLET, DELAYED RELEASE ORAL at 06:35

## 2020-01-21 RX ADMIN — IPRATROPIUM BROMIDE 0.5 MG: 0.5 SOLUTION RESPIRATORY (INHALATION) at 19:37

## 2020-01-21 RX ADMIN — POTASSIUM CHLORIDE 10 MEQ: 750 TABLET, EXTENDED RELEASE ORAL at 09:55

## 2020-01-21 RX ADMIN — LISINOPRIL 10 MG: 10 TABLET ORAL at 09:55

## 2020-01-21 RX ADMIN — LEVALBUTEROL HYDROCHLORIDE 1.25 MG: 1.25 SOLUTION, CONCENTRATE RESPIRATORY (INHALATION) at 11:41

## 2020-01-21 RX ADMIN — LEVALBUTEROL HYDROCHLORIDE 1.25 MG: 1.25 SOLUTION, CONCENTRATE RESPIRATORY (INHALATION) at 19:37

## 2020-01-21 RX ADMIN — GUAIFENESIN AND DEXTROMETHORPHAN 10 ML: 100; 10 SYRUP ORAL at 03:19

## 2020-01-21 RX ADMIN — FLUTICASONE PROPIONATE 1 SPRAY: 50 SPRAY, METERED NASAL at 09:55

## 2020-01-21 RX ADMIN — LACTULOSE 10 G: 10 SOLUTION ORAL at 09:55

## 2020-01-21 RX ADMIN — HYDROCODONE BITARTRATE AND HOMATROPINE METHYLBROMIDE 5 ML: 5; 1.5 SOLUTION ORAL at 18:28

## 2020-01-21 RX ADMIN — METHYLPREDNISOLONE SODIUM SUCCINATE 60 MG: 125 INJECTION, POWDER, FOR SOLUTION INTRAMUSCULAR; INTRAVENOUS at 06:35

## 2020-01-21 RX ADMIN — BENZONATATE 100 MG: 100 CAPSULE ORAL at 18:22

## 2020-01-21 RX ADMIN — IPRATROPIUM BROMIDE 0.5 MG: 0.5 SOLUTION RESPIRATORY (INHALATION) at 15:45

## 2020-01-21 RX ADMIN — AZITHROMYCIN 500 MG: 250 TABLET, FILM COATED ORAL at 15:29

## 2020-01-21 RX ADMIN — LEVETIRACETAM 1500 MG: 500 TABLET, FILM COATED ORAL at 18:24

## 2020-01-21 RX ADMIN — HYDROCODONE BITARTRATE AND HOMATROPINE METHYLBROMIDE 5 ML: 5; 1.5 SOLUTION ORAL at 12:47

## 2020-01-21 RX ADMIN — ASPIRIN 81 MG 81 MG: 81 TABLET ORAL at 09:55

## 2020-01-21 RX ADMIN — METHYLPREDNISOLONE SODIUM SUCCINATE 60 MG: 125 INJECTION, POWDER, FOR SOLUTION INTRAMUSCULAR; INTRAVENOUS at 00:07

## 2020-01-21 RX ADMIN — ENOXAPARIN SODIUM 40 MG: 40 INJECTION SUBCUTANEOUS at 09:55

## 2020-01-21 RX ADMIN — DOXYCYCLINE 100 MG: 100 CAPSULE ORAL at 21:15

## 2020-01-21 RX ADMIN — LEVALBUTEROL HYDROCHLORIDE 1.25 MG: 1.25 SOLUTION, CONCENTRATE RESPIRATORY (INHALATION) at 15:45

## 2020-01-21 RX ADMIN — DOCUSATE SODIUM 200 MG: 100 CAPSULE, LIQUID FILLED ORAL at 09:55

## 2020-01-21 RX ADMIN — MONTELUKAST SODIUM 10 MG: 10 TABLET, FILM COATED ORAL at 09:55

## 2020-01-21 RX ADMIN — BENZONATATE 100 MG: 100 CAPSULE ORAL at 10:11

## 2020-01-21 RX ADMIN — METHYLPREDNISOLONE SODIUM SUCCINATE 60 MG: 125 INJECTION, POWDER, FOR SOLUTION INTRAMUSCULAR; INTRAVENOUS at 18:28

## 2020-01-21 RX ADMIN — INSULIN LISPRO 1 UNITS: 100 INJECTION, SOLUTION INTRAVENOUS; SUBCUTANEOUS at 14:00

## 2020-01-21 RX ADMIN — GUAIFENESIN AND DEXTROMETHORPHAN 10 ML: 100; 10 SYRUP ORAL at 15:29

## 2020-01-21 RX ADMIN — METHYLPREDNISOLONE SODIUM SUCCINATE 60 MG: 125 INJECTION, POWDER, FOR SOLUTION INTRAMUSCULAR; INTRAVENOUS at 12:46

## 2020-01-21 RX ADMIN — CEFTRIAXONE SODIUM 1000 MG: 10 INJECTION, POWDER, FOR SOLUTION INTRAVENOUS at 18:22

## 2020-01-21 RX ADMIN — HYDROCODONE BITARTRATE AND HOMATROPINE METHYLBROMIDE 5 ML: 5; 1.5 SOLUTION ORAL at 07:02

## 2020-01-21 RX ADMIN — FOLIC ACID 1 MG: 1 TABLET ORAL at 09:55

## 2020-01-21 RX ADMIN — HYDROCODONE BITARTRATE AND HOMATROPINE METHYLBROMIDE 5 ML: 5; 1.5 SOLUTION ORAL at 03:00

## 2020-01-21 RX ADMIN — GUAIFENESIN AND DEXTROMETHORPHAN 10 ML: 100; 10 SYRUP ORAL at 10:11

## 2020-01-21 NOTE — ASSESSMENT & PLAN NOTE
Lab Results   Component Value Date    HGBA1C 5 7 01/19/2020       Recent Labs     01/20/20  2047 01/21/20  0613 01/21/20  1115 01/21/20  1559   POCGLU 142* 105 135 171*       Blood Sugar Average: Last 72 hrs:  (P) 135 [id number]  Glucose levels with good control, continue with current management

## 2020-01-21 NOTE — PROGRESS NOTES
Progress Note - Pulmonary   Rupa Gutierrez 71 y o  female MRN: [id number]  Unit/Bed#: -01 Encounter: [id number]    Assessment:  RUL pneumonia- normal procalcitonin level  Chronic obstructive lung disease  Chronic bronchitis  Hypertension  Hyperlipidemia  Ischemic encephalopathy with left hemiparesis  Type 2 diabetes mellitus  Mild protein calorie malnutrition  Diabetes mellitus type 2  Hypochromic microcytic anemia      Plan:  Oxygen  Antibiotic therapy  Bronchodilator therapy  Steroid taper  Chest x-ray follow-up  Physical therapy and rehabilitation to follow  Anemia workup- consideration for iron replacement therapy    Chief Complaint:   Cough    Subjective: There is ongoing cough productive of scant yellow sputum  There is no reports of hemoptysis  There is shortness of breath, wheezing, and chest tightness  She is having some relief with breathing treatments  Appetite has improved  She has not slept well  Fatigue persists  There is no chest pain, palpitations, or diaphoresis  Oxygen saturation has improved  Objective:     Vitals: Blood pressure 147/80, pulse 61, temperature 98 1 °F (36 7 °C), resp  rate 19, height 5' 5" (1 651 m), weight 79 8 kg (175 lb 14 8 oz), SpO2 98 %  ,Body mass index is 29 28 kg/m²        Intake/Output Summary (Last 24 hours) at 1/21/2020 1236  Last data filed at 1/21/2020 0940  Gross per 24 hour   Intake 620 ml   Output 1150 ml   Net -530 ml       Invasive Devices     Central Venous Catheter Line            Port A Cath Right Chest -- days          Peripheral Intravenous Line            Peripheral IV 01/18/20 Right Forearm 2 days                Physical Exam: /80   Pulse 61   Temp 98 1 °F (36 7 °C)   Resp 19   Ht 5' 5" (1 651 m)   Wt 79 8 kg (175 lb 14 8 oz)   SpO2 98%   BMI 29 28 kg/m²     General Appearance:    Alert, cooperative, no distress, appears stated age   Head:    Normocephalic, without obvious abnormality, atraumatic   Eyes:    PERRL, conjunctiva pink  Sclera is white  Ears:    Normal hearing   Nose:   Nares normal, septum midline, mucosa normal, no drainage    or sinus tenderness   Throat:   Lips, mucosa, and tongue normal; teeth and gums normal   Neck:   Supple, symmetrical, trachea midline, no adenopathy;     thyroid:  no enlargement/tenderness/nodules; no carotid    bruit or JVD   Back:     Symmetric, mild kyphosis   Lungs:     Scattered expiratory rhonchi bilaterally   Chest Wall:    No tenderness or deformity    Heart:    Regular rate and rhythm, S1 and S2 normal, no murmur, rub   or gallop       Abdomen:     Soft, non-tender, bowel sounds active all four quadrants,     no masses, no organomegaly           Extremities:   Extremities normal, atraumatic, no cyanosis  Trace pedal edema bilaterally     Pulses:   2+ and symmetric all extremities   Skin:   Skin color, texture, turgor normal, no rashes or lesions   Lymph nodes:   Cervical, supraclavicular, and axillary nodes normal   Neurologic:   CNII-XII intact, normal strength, sensation and reflexes     throughout        Labs:     WBC 4 31 - 10 16 Thousand/uL 5 83    RBC 3 81 - 5 12 Million/uL 4 81    Hemoglobin 11 5 - 15 4 g/dL 11 1Low     Hematocrit 34 8 - 46 1 % 35 3    MCV 82 - 98 fL 73Low     MCH 26 8 - 34 3 pg 23 1Low     MCHC 31 4 - 37 4 g/dL 31 4    RDW 11 6 - 15 1 % 15 4High     MPV 8 9 - 12 7 fL 10 3    Platelets 260 - 282 Thousands/uL 235      Albumin 3 5 - 5 0 g/dL 2 4Low     Calcium 8 3 - 10 1 mg/dL 8 6    Phosphorus 2 3 - 4 1 mg/dL 2 5    Glucose 65 - 140 mg/dL 115       BUN 5 - 25 mg/dL 13    Creatinine 0 60 - 1 30 mg/dL 0 69       Sodium 136 - 145 mmol/L 142    Potassium 3 5 - 5 3 mmol/L 3 7    Chloride 100 - 108 mmol/L 107    CO2 21 - 32 mmol/L 29    ANION GAP 4 - 13 mmol/L 6    eGFR ml/min/1 73sq m 89        Imaging and other studies:  No new radiographic studies

## 2020-01-21 NOTE — PLAN OF CARE
Problem: Potential for Falls  Goal: Patient will remain free of falls  Description  INTERVENTIONS:  - Assess patient frequently for physical needs  -  Identify cognitive and physical deficits and behaviors that affect risk of falls    -  [place] fall precautions as indicated by assessment   - Educate patient/family on patient safety including physical limitations  - Instruct patient to call for assistance with activity based on assessment  - Modify environment to reduce risk of injury  - Consider OT/PT consult to assist with strengthening/mobility  Outcome: Progressing

## 2020-01-21 NOTE — ASSESSMENT & PLAN NOTE
· Confirmed by CT a imaging showing right upper lobe consolidation concerning for pneumonia  · On day 4 of IV antibiotics, continue with current management  · Follow-up cultures, continued symptom control

## 2020-01-21 NOTE — PROGRESS NOTES
Progress Note - Claria Pod 1950, 71 y o  female MRN: [id number]    Unit/Bed#: -01 Encounter: [id number]    Primary Care Provider: No primary care provider on file     Date and time admitted to hospital: 1/18/2020 12:00 PM        Community acquired pneumonia  Assessment & Plan  · Confirmed by CT a imaging showing right upper lobe consolidation concerning for pneumonia  · On day 4 of IV antibiotics, continue with current management  · Follow-up cultures, continued symptom control    Diabetes mellitus [facility])  Assessment & Plan  Lab Results   Component Value Date    HGBA1C 5 7 01/19/2020       Recent Labs     01/20/20  2047 01/21/20  0613 01/21/20  1115 01/21/20  1559   POCGLU 142* 105 135 171*       Blood Sugar Average: Last 72 hrs:  (P) 135 [id number]  Glucose levels with good control, continue with current management    Influenza A  Assessment & Plan  · Status post treatment with Tamiflu as an outpatient  · No indication further treatment at this time  · Has superimposed bacterial pneumonia and right upper lobe  · Continue with antibiotics for community-acquired pneumonia      Hypertension  Assessment & Plan  · Blood pressure relatively well controlled, continue with home dose lisinopril 10 mg daily    CVA (cerebral vascular accident) ([facility] [address] )  Assessment & Plan  · At previous ischemic CVA back in 12/2017, status post rehab  · Still has residual left-sided hemiparesis  · Continue with high-intensity statin, blood pressure control  · Will continue with PT and OT along with fall precaution    * Asthma exacerbation, non-allergic, severe persistent  Assessment & Plan  · Has severe persistent asthma, requiring 7 intubations previously  · Currently with diffuse expiratory wheeze in all lung fields bilaterally  · Currently on Xopenex and Atrovent along with IV Solu-Medrol q 6 hours with minimal improvement  · Will consult pulmonology, continue with current management and reassess in the morning  · Continue with symptom management for cough  · Asthma exacerbation likely secondary to underlying pneumonia, influenza has been treated prior to ED arrival        VTE Pharmacologic Prophylaxis:   Pharmacologic: Enoxaparin (Lovenox)  Mechanical VTE Prophylaxis in Place: Yes    Patient Centered Rounds: I have performed bedside rounds with nursing staff today  Discussions with Specialists or Other Care Team Provider:  Pulmonology    Education and Discussions with Family / Patient:  Patient's son    Time Spent for Care: 20 minutes  More than 50% of total time spent on counseling and coordination of care as described above  Current Length of Stay: 3 day(s)    Current Patient Status: Inpatient   Certification Statement: The patient will continue to require additional inpatient hospital stay due to Shortness of breath on exertion    Discharge Plan:  48-72 hours    Code Status: Level 1 - Full Code      Subjective:   Patient was seen and examined  She continues to struggle with breathing  Objective:     Vitals:   Temp (24hrs), Av 7 °F (36 5 °C), Min:96 9 °F (36 1 °C), Max:98 1 °F (36 7 °C)    Temp:  [96 9 °F (36 1 °C)-98 1 °F (36 7 °C)] 98 °F (36 7 °C)  HR:  [60-67] 67  Resp:  [16-19] 16  BP: (147-162)/(79-83) 148/83  SpO2:  [96 %-99 %] 96 %  Body mass index is 29 28 kg/m²  Input and Output Summary (last 24 hours): Intake/Output Summary (Last 24 hours) at 2020 1747  Last data filed at 2020 1343  Gross per 24 hour   Intake 500 ml   Output 900 ml   Net -400 ml       Physical Exam:     Physical Exam   Constitutional: She appears well-developed and well-nourished  Eyes: Pupils are equal, round, and reactive to light  Neck: Normal range of motion  Cardiovascular: Normal rate and regular rhythm  Pulmonary/Chest: Effort normal  She has wheezes (Expiratory wheezes)  Abdominal: Soft  She exhibits no distension  Musculoskeletal: She exhibits no edema     Neurological: She is alert  She has normal strength  No cranial nerve deficit  Skin: Skin is warm  No erythema  Psychiatric: She has a normal mood and affect  Her behavior is normal        Additional Data:     Labs:    Results from last 7 days   Lab Units 01/21/20  0640   WBC Thousand/uL 5 83   HEMOGLOBIN g/dL 11 1*   HEMATOCRIT % 35 3   PLATELETS Thousands/uL 235   NEUTROS PCT % 80*   LYMPHS PCT % 14   MONOS PCT % 5   EOS PCT % 0     Results from last 7 days   Lab Units 01/21/20  0639 01/20/20  0607   SODIUM mmol/L 142 143   POTASSIUM mmol/L 3 7 4 0   CHLORIDE mmol/L 107 110*   CO2 mmol/L 29 26   BUN mg/dL 13 12   CREATININE mg/dL 0 69 0 77   ANION GAP mmol/L 6 7   CALCIUM mg/dL 8 6 8 5   ALBUMIN g/dL 2 4* 2 4*  2 3*   TOTAL BILIRUBIN mg/dL  --  0 30   ALK PHOS U/L  --  263*   ALT U/L  --  72   AST U/L  --  48*   GLUCOSE RANDOM mg/dL 115 127     Results from last 7 days   Lab Units 01/20/20  1051   INR  1 09     Results from last 7 days   Lab Units 01/21/20  1559 01/21/20  1115 01/21/20  0613 01/20/20  2047 01/20/20  1552 01/20/20  1135 01/20/20  0644 01/19/20  2040 01/19/20  1638 01/19/20  1454 01/19/20  1143 01/19/20  0604   POC GLUCOSE mg/dl 171* 135 105 142* 120 106 114 131 119 107 138 142*     Results from last 7 days   Lab Units 01/19/20  0538   HEMOGLOBIN A1C % 5 7     Results from last 7 days   Lab Units 01/18/20  1901 01/18/20  1241   LACTIC ACID mmol/L  --  1 4   PROCALCITONIN ng/ml 0 06  --            * I Have Reviewed All Lab Data Listed Above  * Additional Pertinent Lab Tests Reviewed: All Labs Within Last 24 Hours Reviewed    Imaging:      Recent Cultures (last 7 days):     Results from last 7 days   Lab Units 01/18/20  1257 01/18/20  1241   BLOOD CULTURE  No Growth at 48 hrs  No Growth at 48 hrs         Last 24 Hours Medication List:     Current Facility-Administered Medications:  acetaminophen 650 mg Oral Q6H PRN Yves Lamas    acetaminophen 650 mg Oral Q6H PRN Yves Lamas    albuterol 2 5 mg Nebulization Q6H PRN Gilda Burnett MD    aspirin 81 mg Oral Daily [facility]    azithromycin 500 mg Oral Q24H [facility]    benzonatate 100 mg Oral TID PRN Fanta Childressie [facility]    cefTRIAXone 1,000 mg Intravenous Q24H [facility] Last Rate: 1,000 mg (01/20/20 1719)   dextromethorphan-guaiFENesin 10 mL Oral Q4H PRN [facility]    docusate sodium 200 mg Oral BID [facility]    doxycycline hyclate 100 mg Oral Q12H [address] [facility]    enoxaparin 40 mg Subcutaneous Daily [facility]    fluticasone 1 spray Each Nare Daily Gilda Burnett MD    folic acid 1 mg Oral Daily [facility]    HYDROcodone-homatropine 5 mL Oral Q4H PRN Gilda Burnett MD    insulin lispro 1-5 Units Subcutaneous 4 times day [facility]    ipratropium 0 5 mg Nebulization 4x Daily Gilda Burnett MD    lactulose 10 g Oral Daily [facility]    levalbuterol 1 25 mg Nebulization 4x Daily Gilda Burnett MD    levETIRAcetam 1,500 mg Oral BID [facility]    lisinopril 10 mg Oral Daily [facility]    methylPREDNISolone sodium succinate 60 mg Intravenous Q6H [address] [facility]    montelukast 10 mg Oral Daily [facility]    ondansetron 4 mg Intravenous Q4H PRN Gilda Burnett MD    pantoprazole 40 mg Oral Early Morning [facility]    polyethylene glycol 17 g Oral BID [facility]    potassium chloride 10 mEq Oral BID [facility]    sodium chloride 50 mL/hr Intravenous Continuous [facility] Last Rate: 50 mL/hr (01/18/20 1723)   zolpidem 5 mg Oral HS Michael Ponce MD         Today, Patient Was Seen By: Robina Carlin MD    ** Please Note: Dictation voice to text software may have been used in the creation of this document   **

## 2020-01-22 LAB
ALBUMIN SERPL BCP-MCNC: 2.5 G/DL (ref 3.5–5)
ANION GAP SERPL CALCULATED.3IONS-SCNC: 8 MMOL/L (ref 4–13)
BASOPHILS # BLD AUTO: 0 THOUSANDS/ΜL (ref 0–0.1)
BASOPHILS NFR BLD AUTO: 0 % (ref 0–1)
BUN SERPL-MCNC: 14 MG/DL (ref 5–25)
CALCIUM SERPL-MCNC: 8.4 MG/DL (ref 8.3–10.1)
CHLORIDE SERPL-SCNC: 105 MMOL/L (ref 100–108)
CO2 SERPL-SCNC: 29 MMOL/L (ref 21–32)
CREAT SERPL-MCNC: 0.71 MG/DL (ref 0.6–1.3)
EOSINOPHIL # BLD AUTO: 0 THOUSAND/ΜL (ref 0–0.61)
EOSINOPHIL NFR BLD AUTO: 0 % (ref 0–6)
ERYTHROCYTE [DISTWIDTH] IN BLOOD BY AUTOMATED COUNT: 15.2 % (ref 11.6–15.1)
GFR SERPL CREATININE-BSD FRML MDRD: 87 ML/MIN/1.73SQ M
GLUCOSE SERPL-MCNC: 117 MG/DL (ref 65–140)
GLUCOSE SERPL-MCNC: 122 MG/DL (ref 65–140)
GLUCOSE SERPL-MCNC: 129 MG/DL (ref 65–140)
GLUCOSE SERPL-MCNC: 144 MG/DL (ref 65–140)
GLUCOSE SERPL-MCNC: 178 MG/DL (ref 65–140)
GLUCOSE SERPL-MCNC: 197 MG/DL (ref 65–140)
HCT VFR BLD AUTO: 36.7 % (ref 34.8–46.1)
HGB BLD-MCNC: 11.7 G/DL (ref 11.5–15.4)
IMM GRANULOCYTES # BLD AUTO: 0.03 THOUSAND/UL (ref 0–0.2)
IMM GRANULOCYTES NFR BLD AUTO: 1 % (ref 0–2)
LYMPHOCYTES # BLD AUTO: 0.88 THOUSANDS/ΜL (ref 0.6–4.47)
LYMPHOCYTES NFR BLD AUTO: 18 % (ref 14–44)
MAGNESIUM SERPL-MCNC: 1.9 MG/DL (ref 1.6–2.6)
MCH RBC QN AUTO: 23 PG (ref 26.8–34.3)
MCHC RBC AUTO-ENTMCNC: 31.9 G/DL (ref 31.4–37.4)
MCV RBC AUTO: 72 FL (ref 82–98)
MONOCYTES # BLD AUTO: 0.25 THOUSAND/ΜL (ref 0.17–1.22)
MONOCYTES NFR BLD AUTO: 5 % (ref 4–12)
NEUTROPHILS # BLD AUTO: 3.61 THOUSANDS/ΜL (ref 1.85–7.62)
NEUTS SEG NFR BLD AUTO: 76 % (ref 43–75)
NRBC BLD AUTO-RTO: 0 /100 WBCS
PHOSPHATE SERPL-MCNC: 2.4 MG/DL (ref 2.3–4.1)
PLATELET # BLD AUTO: 246 THOUSANDS/UL (ref 149–390)
PMV BLD AUTO: 10.4 FL (ref 8.9–12.7)
POTASSIUM SERPL-SCNC: 3.4 MMOL/L (ref 3.5–5.3)
RBC # BLD AUTO: 5.08 MILLION/UL (ref 3.81–5.12)
SODIUM SERPL-SCNC: 142 MMOL/L (ref 136–145)
WBC # BLD AUTO: 4.77 THOUSAND/UL (ref 4.31–10.16)

## 2020-01-22 PROCEDURE — 99232 SBSQ HOSP IP/OBS MODERATE 35: CPT | Performed by: FAMILY MEDICINE

## 2020-01-22 PROCEDURE — 85025 COMPLETE CBC W/AUTO DIFF WBC: CPT | Performed by: STUDENT IN AN ORGANIZED HEALTH CARE EDUCATION/TRAINING PROGRAM

## 2020-01-22 PROCEDURE — 83735 ASSAY OF MAGNESIUM: CPT | Performed by: STUDENT IN AN ORGANIZED HEALTH CARE EDUCATION/TRAINING PROGRAM

## 2020-01-22 PROCEDURE — 99232 SBSQ HOSP IP/OBS MODERATE 35: CPT | Performed by: INTERNAL MEDICINE

## 2020-01-22 PROCEDURE — 82948 REAGENT STRIP/BLOOD GLUCOSE: CPT

## 2020-01-22 PROCEDURE — 94760 N-INVAS EAR/PLS OXIMETRY 1: CPT

## 2020-01-22 PROCEDURE — 94640 AIRWAY INHALATION TREATMENT: CPT

## 2020-01-22 PROCEDURE — 97116 GAIT TRAINING THERAPY: CPT

## 2020-01-22 PROCEDURE — 80069 RENAL FUNCTION PANEL: CPT | Performed by: STUDENT IN AN ORGANIZED HEALTH CARE EDUCATION/TRAINING PROGRAM

## 2020-01-22 RX ORDER — ZOLPIDEM TARTRATE 5 MG/1
5 TABLET ORAL
Status: COMPLETED | OUTPATIENT
Start: 2020-01-22 | End: 2020-01-23

## 2020-01-22 RX ORDER — METHYLPREDNISOLONE SODIUM SUCCINATE 40 MG/ML
40 INJECTION, POWDER, LYOPHILIZED, FOR SOLUTION INTRAMUSCULAR; INTRAVENOUS EVERY 8 HOURS SCHEDULED
Status: DISCONTINUED | OUTPATIENT
Start: 2020-01-22 | End: 2020-01-23

## 2020-01-22 RX ADMIN — LEVALBUTEROL HYDROCHLORIDE 1.25 MG: 1.25 SOLUTION, CONCENTRATE RESPIRATORY (INHALATION) at 16:21

## 2020-01-22 RX ADMIN — DOXYCYCLINE 100 MG: 100 CAPSULE ORAL at 08:33

## 2020-01-22 RX ADMIN — PANTOPRAZOLE SODIUM 40 MG: 40 TABLET, DELAYED RELEASE ORAL at 05:54

## 2020-01-22 RX ADMIN — LACTULOSE 10 G: 10 SOLUTION ORAL at 08:32

## 2020-01-22 RX ADMIN — BENZONATATE 100 MG: 100 CAPSULE ORAL at 12:50

## 2020-01-22 RX ADMIN — LEVETIRACETAM 1500 MG: 500 TABLET, FILM COATED ORAL at 18:56

## 2020-01-22 RX ADMIN — METHYLPREDNISOLONE SODIUM SUCCINATE 60 MG: 125 INJECTION, POWDER, FOR SOLUTION INTRAMUSCULAR; INTRAVENOUS at 08:37

## 2020-01-22 RX ADMIN — IPRATROPIUM BROMIDE 0.5 MG: 0.5 SOLUTION RESPIRATORY (INHALATION) at 08:57

## 2020-01-22 RX ADMIN — DOCUSATE SODIUM 200 MG: 100 CAPSULE, LIQUID FILLED ORAL at 08:36

## 2020-01-22 RX ADMIN — HYDROCODONE BITARTRATE AND HOMATROPINE METHYLBROMIDE 5 ML: 5; 1.5 SOLUTION ORAL at 18:56

## 2020-01-22 RX ADMIN — LISINOPRIL 10 MG: 10 TABLET ORAL at 08:36

## 2020-01-22 RX ADMIN — INSULIN LISPRO 1 UNITS: 100 INJECTION, SOLUTION INTRAVENOUS; SUBCUTANEOUS at 14:32

## 2020-01-22 RX ADMIN — IPRATROPIUM BROMIDE 0.5 MG: 0.5 SOLUTION RESPIRATORY (INHALATION) at 20:12

## 2020-01-22 RX ADMIN — LEVALBUTEROL HYDROCHLORIDE 1.25 MG: 1.25 SOLUTION, CONCENTRATE RESPIRATORY (INHALATION) at 11:56

## 2020-01-22 RX ADMIN — METHYLPREDNISOLONE SODIUM SUCCINATE 40 MG: 40 INJECTION, POWDER, FOR SOLUTION INTRAMUSCULAR; INTRAVENOUS at 21:53

## 2020-01-22 RX ADMIN — IPRATROPIUM BROMIDE 0.5 MG: 0.5 SOLUTION RESPIRATORY (INHALATION) at 11:56

## 2020-01-22 RX ADMIN — CEFTRIAXONE SODIUM 1000 MG: 10 INJECTION, POWDER, FOR SOLUTION INTRAVENOUS at 17:45

## 2020-01-22 RX ADMIN — METHYLPREDNISOLONE SODIUM SUCCINATE 40 MG: 40 INJECTION, POWDER, FOR SOLUTION INTRAMUSCULAR; INTRAVENOUS at 14:01

## 2020-01-22 RX ADMIN — POTASSIUM CHLORIDE 10 MEQ: 750 TABLET, EXTENDED RELEASE ORAL at 08:34

## 2020-01-22 RX ADMIN — DOXYCYCLINE 100 MG: 100 CAPSULE ORAL at 21:53

## 2020-01-22 RX ADMIN — IPRATROPIUM BROMIDE 0.5 MG: 0.5 SOLUTION RESPIRATORY (INHALATION) at 16:21

## 2020-01-22 RX ADMIN — POTASSIUM CHLORIDE 10 MEQ: 750 TABLET, EXTENDED RELEASE ORAL at 18:56

## 2020-01-22 RX ADMIN — FOLIC ACID 1 MG: 1 TABLET ORAL at 08:36

## 2020-01-22 RX ADMIN — FLUTICASONE PROPIONATE 1 SPRAY: 50 SPRAY, METERED NASAL at 08:33

## 2020-01-22 RX ADMIN — ZOLPIDEM TARTRATE 5 MG: 5 TABLET, COATED ORAL at 23:09

## 2020-01-22 RX ADMIN — DOCUSATE SODIUM 200 MG: 100 CAPSULE, LIQUID FILLED ORAL at 18:56

## 2020-01-22 RX ADMIN — ENOXAPARIN SODIUM 40 MG: 40 INJECTION SUBCUTANEOUS at 08:37

## 2020-01-22 RX ADMIN — HYDROCODONE BITARTRATE AND HOMATROPINE METHYLBROMIDE 5 ML: 5; 1.5 SOLUTION ORAL at 07:34

## 2020-01-22 RX ADMIN — METHYLPREDNISOLONE SODIUM SUCCINATE 60 MG: 125 INJECTION, POWDER, FOR SOLUTION INTRAMUSCULAR; INTRAVENOUS at 02:00

## 2020-01-22 RX ADMIN — LEVALBUTEROL HYDROCHLORIDE 1.25 MG: 1.25 SOLUTION, CONCENTRATE RESPIRATORY (INHALATION) at 20:12

## 2020-01-22 RX ADMIN — LEVETIRACETAM 1500 MG: 500 TABLET, FILM COATED ORAL at 08:34

## 2020-01-22 RX ADMIN — LEVALBUTEROL HYDROCHLORIDE 1.25 MG: 1.25 SOLUTION, CONCENTRATE RESPIRATORY (INHALATION) at 08:57

## 2020-01-22 RX ADMIN — ASPIRIN 81 MG 81 MG: 81 TABLET ORAL at 08:36

## 2020-01-22 RX ADMIN — MONTELUKAST SODIUM 10 MG: 10 TABLET, FILM COATED ORAL at 08:36

## 2020-01-22 RX ADMIN — HYDROCODONE BITARTRATE AND HOMATROPINE METHYLBROMIDE 5 ML: 5; 1.5 SOLUTION ORAL at 12:50

## 2020-01-22 NOTE — SOCIAL WORK
CM met with pt at bedside and spoke to daughter in law Clarice Payor via [phone]  They are in agreement with a referral to [facility]  Understand that they have freedom of choice  [facility] is first choice, but if they cannot accept, they are willing to have a referral to [facility] at [facility]

## 2020-01-22 NOTE — ASSESSMENT & PLAN NOTE
· Has severe persistent asthma, requiring 7 intubations previously  · Currently with diffuse expiratory wheeze in all lung fields bilaterally  · Currently on Xopenex and Atrovent along with IV Solu-Medrol q 8 hours with minimal improvement  · Will consult pulmonology, continue with current management and reassess in the morning  · Continue with symptom management for cough  · Asthma exacerbation likely secondary to underlying pneumonia, influenza has been treated prior to ED arrival

## 2020-01-22 NOTE — ASSESSMENT & PLAN NOTE
Lab Results   Component Value Date    HGBA1C 5 7 01/19/2020       Recent Labs     01/22/20  0630 01/22/20  1148 01/22/20  1432 01/22/20  1549   POCGLU 122 117 197* 144*       Blood Sugar Average: Last 72 hrs:  (P) 928 [id number]  Glucose levels with good control, continue with current management

## 2020-01-22 NOTE — ASSESSMENT & PLAN NOTE
· Confirmed by CT a imaging showing right upper lobe consolidation concerning for pneumonia  · On day 5 of IV antibiotics, continue with current management  · Follow-up cultures, continued symptom control

## 2020-01-22 NOTE — PLAN OF CARE
Problem: Potential for Falls  Goal: Patient will remain free of falls  Description  INTERVENTIONS:  - Assess patient frequently for physical needs  -  Identify cognitive and physical deficits and behaviors that affect risk of falls    -  [place] fall precautions as indicated by assessment   - Educate patient/family on patient safety including physical limitations  - Instruct patient to call for assistance with activity based on assessment  - Modify environment to reduce risk of injury  - Consider OT/PT consult to assist with strengthening/mobility  Outcome: Progressing

## 2020-01-22 NOTE — PROGRESS NOTES
Progress Note - Pulmonary   Fabián Springer 71 y o  female MRN: [id number]  Unit/Bed#: -01 Encounter: [id number]    Assessment:  COPD exacerbation  Chronic bronchitis  Resolving right upper lobe consolidation  Essential hypertension  Hyperlipidemia  Type 2 diabetes mellitus  Anemia  Ischemic encephalopathy with left hemiparesis  Physical deconditioning    Plan:  Bronchodilator therapy  Systemic steroid taper  Transition antibiotics to oral  Physical therapy and rehabilitation  Discharge planning to rehabilitation facility  Anemia workup pending    Chief Complaint:   Shortness of breath and wheezing    Subjective:   She feels somewhat better today, however there is persistent wheezing and chest tightness  Cough is less frequent, but sputum remains yellow green in color  She denies hemoptysis  There is no fever, chills, night sweats  She denies chest pain, palpitations, or diaphoresis  There is improved oxygen saturation- she is no longer requiring supplemental oxygen  Appetite is improved  She denies nausea vomiting  Objective:     Vitals: Blood pressure 150/76, pulse 63, temperature 98 1 °F (36 7 °C), resp  rate 17, height 5' 5" (1 651 m), weight 79 1 kg (174 lb 6 1 oz), SpO2 98 %  ,Body mass index is 29 02 kg/m²        Intake/Output Summary (Last 24 hours) at 1/22/2020 1147  Last data filed at 1/22/2020 8224  Gross per 24 hour   Intake    Output 2000 ml   Net -2000 ml       Invasive Devices     Central Venous Catheter Line            Port A Cath Right Chest -- days          Peripheral Intravenous Line            Peripheral IV 01/18/20 Right Forearm 3 days                Physical Exam: /76   Pulse 63   Temp 98 1 °F (36 7 °C)   Resp 17   Ht 5' 5" (1 651 m)   Wt 79 1 kg (174 lb 6 1 oz)   SpO2 99%   BMI 29 02 kg/m²     General Appearance:    Alert, cooperative, no distress, appears stable in a sitting position   Head:    Normocephalic, without obvious abnormality, atraumatic   Eyes: PERRL, conjunctiva and sclera are normal   Ears:    Normal   Nose:   Nares normal, septum midline, mucosa normal, no drainage    or sinus tenderness   Throat:   Lips, mucosa, and tongue normal; teeth and gums normal   Neck:   Supple, symmetrical, trachea midline, no adenopathy;     thyroid:  no enlargement/tenderness/nodules; no carotid    bruit or JVD   Back:     Symmetric, kyphosis   Lungs:     Scattered expiratory rhonchi bilaterally with a prolonged expiratory phase   Chest Wall:    No tenderness or deformity    Heart:    Regular rate and rhythm, S1 and S2 normal, no murmur, rub   or gallop       Abdomen:     Soft, non-tender, bowel sounds active all four quadrants,     no masses, no organomegaly           Extremities:   Extremities normal, atraumatic, no cyanosis  Trace pedal edema   Pulses:   2+ and symmetric all extremities   Skin:   Skin color, texture, turgor normal, no rashes or lesions   Lymph nodes:   Cervical, supraclavicular, and axillary nodes normal   Neurologic:   CNII-XII intact, generalized weakness  No sensory impairment  Labs: I have personally reviewed pertinent lab results  WBC 4 31 - 10 16 Thousand/uL 4 77  5 83  7 99    RBC 3 81 - 5 12 Million/uL 5 08  4 81  4 50    Hemoglobin 11 5 - 15 4 g/dL 11 7  11 1Low   10 5Low     Hematocrit 34 8 - 46 1 % 36 7  35 3  33 2Low     MCV 82 - 98 fL 72Low   73Low   74Low     MCH 26 8 - 34 3 pg 23  0Low   23 1Low   23 3Low     MCHC 31 4 - 37 4 g/dL 31 9  31 4  31 6    RDW 11 6 - 15 1 % 15  2High   15  4High   15  6High     MPV 8 9 - 12 7 fL 10 4  10 3  10 1    Platelets 909 - 353 Thousands/uL 246  235  225      Albumin 3 5 - 5 0 g/dL 2 5Low   2 4Low   2 4Low   2 3Low     Calcium 8 3 - 10 1 mg/dL 8 4  8 6   8 5    Phosphorus 2 3 - 4 1 mg/dL 2 4  2 5   2 8    Glucose 65 - 140 mg/dL 129  115 CM  127 CM      BUN 5 - 25 mg/dL 14  13   12    Creatinine 0 60 - 1 30 mg/dL 0 71  0 69 CM  0 77 CM           Imaging and other studies:  No new radiographic studies

## 2020-01-22 NOTE — PROGRESS NOTES
Progress Note - Shahla Keen 1950, 71 y o  female MRN: [id number]    Unit/Bed#: -01 Encounter: [id number]    Primary Care Provider: No primary care provider on file     Date and time admitted to hospital: 1/18/2020 12:00 PM        Community acquired pneumonia  Assessment & Plan  · Confirmed by CT a imaging showing right upper lobe consolidation concerning for pneumonia  · On day 5 of IV antibiotics, continue with current management  · Follow-up cultures, continued symptom control    Diabetes mellitus [facility])  Assessment & Plan  Lab Results   Component Value Date    HGBA1C 5 7 01/19/2020       Recent Labs     01/22/20  0630 01/22/20  1148 01/22/20  1432 01/22/20  1549   POCGLU 122 117 197* 144*       Blood Sugar Average: Last 72 hrs:  (P) 720 [id number]  Glucose levels with good control, continue with current management    Influenza A  Assessment & Plan  · Status post treatment with Tamiflu as an outpatient  · No indication further treatment at this time  · Has superimposed bacterial pneumonia and right upper lobe  · Continue with antibiotics for community-acquired pneumonia      CVA (cerebral vascular accident) [facility])  Assessment & Plan  · At previous ischemic CVA back in 12/2017, status post rehab  · Still has residual left-sided hemiparesis  · Continue with high-intensity statin, blood pressure control  · Will continue with PT and OT along with fall precaution    * Asthma exacerbation, non-allergic, severe persistent  Assessment & Plan  · Has severe persistent asthma, requiring 7 intubations previously  · Currently with diffuse expiratory wheeze in all lung fields bilaterally  · Currently on Xopenex and Atrovent along with IV Solu-Medrol q 8 hours with minimal improvement  · Will consult pulmonology, continue with current management and reassess in the morning  · Continue with symptom management for cough  · Asthma exacerbation likely secondary to underlying pneumonia, influenza has been treated prior to ED arrival          VTE Pharmacologic Prophylaxis:   Pharmacologic: Enoxaparin (Lovenox)  Mechanical VTE Prophylaxis in Place: Yes    Patient Centered Rounds: I have performed bedside rounds with nursing staff today  Discussions with Specialists or Other Care Team Provider: nursing    Education and Discussions with Family / Patient:  Patient    Time Spent for Care: 20 minutes  More than 50% of total time spent on counseling and coordination of care as described above  Current Length of Stay: 4 day(s)    Current Patient Status: Inpatient   Certification Statement: The patient will continue to require additional inpatient hospital stay due to IV steroids    Discharge Plan:  48 hours    Code Status: Level 1 - Full Code      Subjective:   Patient was seen and examined  She reports improvement of the breathing, but she still reports feeling weak  Objective:     Vitals:   Temp (24hrs), Av °F (36 7 °C), Min:97 8 °F (36 6 °C), Max:98 1 °F (36 7 °C)    Temp:  [97 8 °F (36 6 °C)-98 1 °F (36 7 °C)] 97 8 °F (36 6 °C)  HR:  [48-63] 48  Resp:  [17-18] 18  BP: (150-166)/(72-87) 166/72  SpO2:  [96 %-99 %] 98 %  Body mass index is 29 02 kg/m²  Input and Output Summary (last 24 hours): Intake/Output Summary (Last 24 hours) at 2020 1733  Last data filed at 2020 5253  Gross per 24 hour   Intake    Output 1800 ml   Net -1800 ml       Physical Exam:     Physical Exam   Constitutional: She appears well-developed and well-nourished  HENT:   Head: Normocephalic and atraumatic  Eyes: Pupils are equal, round, and reactive to light  Cardiovascular: Normal rate and regular rhythm  Pulmonary/Chest: Effort normal  She has wheezes ( profound generalized expiratory wheezing)  She has no rales  Abdominal: Soft  She exhibits no distension  Musculoskeletal: She exhibits no edema  Neurological: She is alert  She has normal strength  No cranial nerve deficit  Skin: Skin is warm  No erythema  Psychiatric: She has a normal mood and affect  Her behavior is normal          Additional Data:     Labs:    Results from last 7 days   Lab Units 01/22/20  0625   WBC Thousand/uL 4 77   HEMOGLOBIN g/dL 11 7   HEMATOCRIT % 36 7   PLATELETS Thousands/uL 246   NEUTROS PCT % 76*   LYMPHS PCT % 18   MONOS PCT % 5   EOS PCT % 0     Results from last 7 days   Lab Units 01/22/20  0625  01/20/20  0607   SODIUM mmol/L 142   < > 143   POTASSIUM mmol/L 3 4*   < > 4 0   CHLORIDE mmol/L 105   < > 110*   CO2 mmol/L 29   < > 26   BUN mg/dL 14   < > 12   CREATININE mg/dL 0 71   < > 0 77   ANION GAP mmol/L 8   < > 7   CALCIUM mg/dL 8 4   < > 8 5   ALBUMIN g/dL 2 5*   < > 2 4*  2 3*   TOTAL BILIRUBIN mg/dL  --   --  0 30   ALK PHOS U/L  --   --  263*   ALT U/L  --   --  72   AST U/L  --   --  48*   GLUCOSE RANDOM mg/dL 129   < > 127    < > = values in this interval not displayed  Results from last 7 days   Lab Units 01/20/20  1051   INR  1 09     Results from last 7 days   Lab Units 01/22/20  1549 01/22/20  1432 01/22/20  1148 01/22/20  0630 01/21/20  2043 01/21/20  1829 01/21/20  1559 01/21/20  1115 01/21/20  0613 01/20/20  2047 01/20/20  1552 01/20/20  1135   POC GLUCOSE mg/dl 144* 197* 117 122 185* 142* 171* 135 105 142* 120 106     Results from last 7 days   Lab Units 01/19/20  0538   HEMOGLOBIN A1C % 5 7     Results from last 7 days   Lab Units 01/18/20  1901 01/18/20  1241   LACTIC ACID mmol/L  --  1 4   PROCALCITONIN ng/ml 0 06  --            * I Have Reviewed All Lab Data Listed Above  * Additional Pertinent Lab Tests Reviewed: All Labs Within Last 24 Hours Reviewed    Imaging:      Recent Cultures (last 7 days):     Results from last 7 days   Lab Units 01/18/20  1257 01/18/20  1241   BLOOD CULTURE  No Growth at 72 hrs  No Growth at 72 hrs         Last 24 Hours Medication List:     Current Facility-Administered Medications:  acetaminophen 650 mg Oral Q6H PRN Yves Lamas    acetaminophen 650 mg Oral Q6H PRN Markvdebbi Indra Quiroz    albuterol 2 5 mg Nebulization Q6H PRN Garcia Higuera MD    aspirin 81 mg Oral Daily [facility]    benzonatate 100 mg Oral TID [facility]    cefTRIAXone 1,000 mg Intravenous Q24H [facility] Last Rate: 1,000 mg (01/21/20 1822)   dextromethorphan-guaiFENesin 10 mL Oral Q4H PRN [facility]    docusate sodium 200 mg Oral BID [facility]    doxycycline hyclate 100 mg Oral Q12H [facility]    enoxaparin 40 mg Subcutaneous Daily [facility]    fluticasone 1 spray Each Nare Daily Garcia Higuera MD    folic acid 1 mg Oral Daily [facility]    HYDROcodone-homatropine 5 mL Oral Q4H PRN Garcia Higuera MD    insulin lispro 1-5 Units Subcutaneous 4 times day [facility]    ipratropium 0 5 mg Nebulization 4x Daily Garcia Higuera MD    lactulose 10 g Oral Daily [facility]    levalbuterol 1 25 mg Nebulization 4x Daily Garcia Higuera MD    levETIRAcetam 1,500 mg Oral BID [facility]    lisinopril 10 mg Oral Daily [facility]    methylPREDNISolone sodium succinate 40 mg Intravenous [facility] Primitivo Etienne MD    montelukast 10 mg Oral Daily [facility]    ondansetron 4 mg Intravenous Q4H PRN Garcia Higuera MD    pantoprazole 40 mg Oral Early Morning [facility]    polyethylene glycol 17 g Oral BID [facility]    potassium chloride 10 mEq Oral BID Fleta Roughen         Today, Patient Was Seen By: Primitivo Etienne MD    ** Please Note: Dictation voice to text software may have been used in the creation of this document   **

## 2020-01-22 NOTE — UTILIZATION REVIEW
Continued Stay Review    Date: 1/22/20                          Current Patient Class: inpatient  Current Level of Care: med surg    HPI:69 y o  female initially admitted on 1/18/20 as inpatient due to copd exacerbation with IV steroids in progress and positive flu culture  Assessment/Plan:   1/20: has severe persistent asthma, hx 7 prior intubations  CT concerning for RUL pneumonia  IV antbx in progress  Lungs with diffuse expiratory wheezes throughout  Pulmonary consulted and feels this is post viral pneumonia, likely staph along with chronic bronchitis  Needs to keep o2 sat >92%, continue IV antbx with narrowed spectrum, provide mucolytics, bronchodilators, rpt cxr, steroid taper, and sugar control  GI consulted due to finding of elevated LFT's  GI feels pt has sepsis from underlying pneumonia, which has worsened her liver enzymes  The enzymes are improving today  1/21: has ongoing yellow productive cough with sob, diffuse expiratory wheezing, and chest tightness  Seems to be struggling with breathing, although has good sats on RA  Some relief with nebs  Continue IV antbx, bronchodilators, steroids, and hycodan & robitussin cough syrups  Needs anemia workup with possible iron replacement after hgb dropped almost 2 points from admission date  1/22: is on RA  Feeling better today, but does have persistent scattered expiratory rhonchi & wheezing and chest tightness  Less frequent cough, but yellow green production continues  PT is working with pt  Gets lightheaded and is limited by fatigue  Will need STR upon discharge  Pertinent Labs/Diagnostic Results:   1/19 RUQ US w/liver doppler: Slow velocities within the intrahepatic portal veins, concerning for portal venous hypertension      Results from last 7 days   Lab Units 01/22/20  0625 01/21/20  0640 01/20/20  0607 01/19/20  0527  01/18/20  1241   WBC Thousand/uL 4 77 5 83 7 99 3 24*  --  6 20   HEMOGLOBIN g/dL 11 7 11 1* 10 5* 10 6*  --  12 1 HEMATOCRIT % 36 7 35 3 33 2* 33 6*  --  37 8   PLATELETS Thousands/uL 246 235 225 212   < > 258   NEUTROS ABS Thousands/µL 3 61 4 72 6 57  --   --  4 41    < > = values in this interval not displayed           Results from last 7 days   Lab Units 01/22/20  0625 01/21/20  0640 01/21/20  0639 01/20/20  0607 01/19/20  0527 01/18/20  1241   SODIUM mmol/L 142  --  142 143 144 140   POTASSIUM mmol/L 3 4*  --  3 7 4 0 3 6 3 9   CHLORIDE mmol/L 105  --  107 110* 111* 104   CO2 mmol/L 29  --  29 26 24 26   ANION GAP mmol/L 8  --  6 7 9 10   BUN mg/dL 14  --  13 12 9 11   CREATININE mg/dL 0 71  --  0 69 0 77 0 63 0 71   EGFR ml/min/1 73sq m 87  --  89 79 92 87   CALCIUM mg/dL 8 4  --  8 6 8 5 8 4 8 8   MAGNESIUM mg/dL 1 9 1 8  --  1 8  --   --    PHOSPHORUS mg/dL 2 4  --  2 5 2 8  --   --      Results from last 7 days   Lab Units 01/22/20  0625 01/21/20  0639 01/20/20  0607 01/19/20  0527 01/18/20  1241   AST U/L  --   --  48* 72* 90*   ALT U/L  --   --  72 87* 98*   ALK PHOS U/L  --   --  263* 302* 357*   TOTAL PROTEIN g/dL  --   --  6 1* 6 3* 7 2   ALBUMIN g/dL 2 5* 2 4* 2 4*  2 3* 2 4* 3 0*   TOTAL BILIRUBIN mg/dL  --   --  0 30 0 30 0 50   BILIRUBIN DIRECT mg/dL  --   --  0 09  --   --      Results from last 7 days   Lab Units 01/22/20  0630 01/21/20  2043 01/21/20  1829 01/21/20  1559 01/21/20  1115 01/21/20  0613 01/20/20  2047 01/20/20  1552 01/20/20  1135 01/20/20  0644   POC GLUCOSE mg/dl 122 185* 142* 171* 135 105 142* 120 106 114     Results from last 7 days   Lab Units 01/22/20  0625 01/21/20  0639 01/20/20  0607 01/19/20  0527 01/18/20  1241   GLUCOSE RANDOM mg/dL 129 115 127 142* 99     Results from last 7 days   Lab Units 01/19/20  0538   HEMOGLOBIN A1C % 5 7   EAG mg/dl 117     Results from last 7 days   Lab Units 01/18/20  1241   TROPONIN I ng/mL <0 02     Results from last 7 days   Lab Units 01/18/20  1314   D-DIMER QUANTITATIVE ug/ml FEU 0 88*     Results from last 7 days   Lab Units 01/20/20  1051 PROTIME seconds 14 1   INR  1 09     Results from last 7 days   Lab Units 01/18/20  1902   TSH 3RD GENERATON uIU/mL 0 419     Results from last 7 days   Lab Units 01/18/20  1901   PROCALCITONIN ng/ml 0 06     Results from last 7 days   Lab Units 01/18/20  1241   LACTIC ACID mmol/L 1 4     Results from last 7 days   Lab Units 01/18/20  1216   INFLUENZA A PCR  None Detected   RSV PCR  None Detected     Results from last 7 days   Lab Units 01/18/20  1257 01/18/20  1241   BLOOD CULTURE  No Growth at 72 hrs  No Growth at 72 hrs     Vital Signs:   Date/Time  Temp  Pulse  Resp  BP  MAP (mmHg)  SpO2   01/22/20 0859            98 %   01/22/20 0858            98 %   01/22/20 07:19:20  98 1 °F (36 7 °C)  63  17  150/76  101  96 %   01/21/20 23:55:57  98 °F (36 7 °C)  58  18  165/87  113  96 %   01/21/20 1937            98 %   01/21/20 15:21:27  98 °F (36 7 °C)  67  16  148/83  105  96 %   01/21/20 1141            98 %   01/21/20 0801            99 %   01/21/20 07:27:48  98 1 °F (36 7 °C)  61  19  147/80  102  99 %   01/21/20 00:16:06  96 9 °F (36 1 °C)Abnormal   60  17  162/79  107  98 %         Medications:   Scheduled Medications:    Medications:  aspirin 81 mg Oral Daily   cefTRIAXone 1,000 mg Intravenous Q24H   docusate sodium 200 mg Oral BID   doxycycline hyclate 100 mg Oral Q12H [facility]   enoxaparin 40 mg Subcutaneous Daily   fluticasone 1 spray Each Nare Daily   folic acid 1 mg Oral Daily   insulin lispro 1-5 Units Subcutaneous 4 times day   ipratropium 0 5 mg Nebulization 4x Daily   lactulose 10 g Oral Daily   levalbuterol 1 25 mg Nebulization 4x Daily   levETIRAcetam 1,500 mg Oral BID   lisinopril 10 mg Oral Daily   methylPREDNISolone sodium succinate 40 mg Intravenous Q8H [address]   montelukast 10 mg Oral Daily   pantoprazole 40 mg Oral Early Morning   polyethylene glycol 17 g Oral BID   potassium chloride 10 mEq Oral BID        PRN Meds:    acetaminophen 650 mg Oral Q6H PRN   acetaminophen 650 mg Oral Q6H PRN   albuterol 2 5 mg Nebulization Q6H PRN   benzonatate 100 mg Oral TID PRN   dextromethorphan-guaiFENesin 10 mL Oral Q4H PRN   HYDROcodone-homatropine 5 mL Oral Q4H PRN   ondansetron 4 mg Intravenous Q4H PRN       Discharge Plan: TBD    Network Utilization Review Department  [email or web address]  ATTENTION: Please call with any questions or concerns to [phone] and carefully listen to the prompts so that you are directed to the right person  All voicemails are confidential   Anne Marie Baker all requests for admission clinical reviews, approved or denied determinations and any other requests to dedicated fax number below belonging to the campus where the patient is receiving treatment   List of dedicated fax numbers for the Facilities:  [address] DENIALS (Administrative/Medical Necessity) [phone]   [address] (Maternity/NICU/Pediatrics) [phone]   [facility] [phone]   Viviana Hernandez [phone]   [facility] [phone]   [address] [place] [address]  [phone]   1205 [facility] [address] [place] [address] [phone]   [facility]  [phone]   [address] [facility] [address]  2401 [facility] [address] [facility] [phone]

## 2020-01-22 NOTE — PLAN OF CARE
Problem: PHYSICAL THERAPY ADULT  Goal: Performs mobility at highest level of function for planned discharge setting  See evaluation for individualized goals  Description  Treatment/Interventions: Functional transfer training, Therapeutic exercise, Endurance training, Patient/family training, Equipment eval/education, Gait training, Spoke to nursing, OT, LE strengthening/ROM, Elevations, Bed mobility          See flowsheet documentation for full assessment, interventions and recommendations  Outcome: Progressing  Note:   Prognosis: Good  Problem List: Decreased strength, Decreased endurance, Impaired balance, Decreased mobility, Impaired sensation, Pain, Impaired vision  Assessment: Pt seen for PT treatment session this date with interventions consisting of gait training w/ emphasis on improving pt's ability to ambulate level surfaces x 15' x 2 + 3' with min A provided by therapist with RW + chair follow for enhanced safety and fall prevention and therapeutic activity consisting of training: sit<>stand transfers  Pt agreeable to PT treatment session upon arrival, pt found seated OOB in recliner, in no apparent distress, A&O x 4 and responsive  In comparison to previous session, pt with improvements in functional transfers with improving independence and endurance as evidenced by distance ambulated on level surfaces using RW  Post session: pt returned back to recliner, chair alarm engaged, all needs in reach and RN notified of session findings/recommendations  Continue to recommend STR at time of d/c in order to maximize pt's functional independence and safety w/ mobility  Pt continues to be functioning below baseline level, and remains limited 2* factors listed above  PT will continue to see pt while here in order to address the deficits listed above and provide interventions consistent w/ POC in effort to achieve STGs    Barriers to Discharge: Inaccessible home environment, Decreased caregiver support Recommendation: Short-term skilled PT     PT - OK to Discharge: Yes(when medically cleared; if to STR)    See flowsheet documentation for full assessment

## 2020-01-23 LAB
GLUCOSE SERPL-MCNC: 104 MG/DL (ref 65–140)
GLUCOSE SERPL-MCNC: 124 MG/DL (ref 65–140)
GLUCOSE SERPL-MCNC: 124 MG/DL (ref 65–140)
GLUCOSE SERPL-MCNC: 174 MG/DL (ref 65–140)

## 2020-01-23 PROCEDURE — 94760 N-INVAS EAR/PLS OXIMETRY 1: CPT

## 2020-01-23 PROCEDURE — 82948 REAGENT STRIP/BLOOD GLUCOSE: CPT

## 2020-01-23 PROCEDURE — 94640 AIRWAY INHALATION TREATMENT: CPT

## 2020-01-23 PROCEDURE — 99232 SBSQ HOSP IP/OBS MODERATE 35: CPT | Performed by: FAMILY MEDICINE

## 2020-01-23 PROCEDURE — 99232 SBSQ HOSP IP/OBS MODERATE 35: CPT | Performed by: INTERNAL MEDICINE

## 2020-01-23 PROCEDURE — 97116 GAIT TRAINING THERAPY: CPT

## 2020-01-23 RX ORDER — PREDNISONE 20 MG/1
40 TABLET ORAL DAILY
Status: DISCONTINUED | OUTPATIENT
Start: 2020-01-23 | End: 2020-01-24 | Stop reason: HOSPADM

## 2020-01-23 RX ORDER — LEVALBUTEROL 1.25 MG/.5ML
1.25 SOLUTION, CONCENTRATE RESPIRATORY (INHALATION)
Status: DISCONTINUED | OUTPATIENT
Start: 2020-01-23 | End: 2020-01-24 | Stop reason: HOSPADM

## 2020-01-23 RX ADMIN — HYDROCODONE BITARTRATE AND HOMATROPINE METHYLBROMIDE 5 ML: 5; 1.5 SOLUTION ORAL at 13:33

## 2020-01-23 RX ADMIN — LEVALBUTEROL HYDROCHLORIDE 1.25 MG: 1.25 SOLUTION, CONCENTRATE RESPIRATORY (INHALATION) at 13:47

## 2020-01-23 RX ADMIN — LEVETIRACETAM 1500 MG: 500 TABLET, FILM COATED ORAL at 08:57

## 2020-01-23 RX ADMIN — LEVALBUTEROL HYDROCHLORIDE 1.25 MG: 1.25 SOLUTION, CONCENTRATE RESPIRATORY (INHALATION) at 19:59

## 2020-01-23 RX ADMIN — BENZONATATE 100 MG: 100 CAPSULE ORAL at 07:42

## 2020-01-23 RX ADMIN — LEVALBUTEROL HYDROCHLORIDE 1.25 MG: 1.25 SOLUTION, CONCENTRATE RESPIRATORY (INHALATION) at 07:48

## 2020-01-23 RX ADMIN — IPRATROPIUM BROMIDE 0.5 MG: 0.5 SOLUTION RESPIRATORY (INHALATION) at 19:59

## 2020-01-23 RX ADMIN — ZOLPIDEM TARTRATE 5 MG: 5 TABLET, COATED ORAL at 22:36

## 2020-01-23 RX ADMIN — MONTELUKAST SODIUM 10 MG: 10 TABLET, FILM COATED ORAL at 08:56

## 2020-01-23 RX ADMIN — METHYLPREDNISOLONE SODIUM SUCCINATE 40 MG: 40 INJECTION, POWDER, FOR SOLUTION INTRAMUSCULAR; INTRAVENOUS at 05:20

## 2020-01-23 RX ADMIN — FLUTICASONE PROPIONATE 1 SPRAY: 50 SPRAY, METERED NASAL at 08:58

## 2020-01-23 RX ADMIN — POTASSIUM CHLORIDE 10 MEQ: 750 TABLET, EXTENDED RELEASE ORAL at 08:56

## 2020-01-23 RX ADMIN — LACTULOSE 10 G: 10 SOLUTION ORAL at 08:55

## 2020-01-23 RX ADMIN — DOCUSATE SODIUM 200 MG: 100 CAPSULE, LIQUID FILLED ORAL at 17:39

## 2020-01-23 RX ADMIN — INSULIN LISPRO 1 UNITS: 100 INJECTION, SOLUTION INTRAVENOUS; SUBCUTANEOUS at 17:41

## 2020-01-23 RX ADMIN — PANTOPRAZOLE SODIUM 40 MG: 40 TABLET, DELAYED RELEASE ORAL at 05:20

## 2020-01-23 RX ADMIN — ENOXAPARIN SODIUM 40 MG: 40 INJECTION SUBCUTANEOUS at 08:54

## 2020-01-23 RX ADMIN — BENZONATATE 100 MG: 100 CAPSULE ORAL at 13:33

## 2020-01-23 RX ADMIN — IPRATROPIUM BROMIDE 0.5 MG: 0.5 SOLUTION RESPIRATORY (INHALATION) at 13:47

## 2020-01-23 RX ADMIN — DOCUSATE SODIUM 200 MG: 100 CAPSULE, LIQUID FILLED ORAL at 08:56

## 2020-01-23 RX ADMIN — FOLIC ACID 1 MG: 1 TABLET ORAL at 08:57

## 2020-01-23 RX ADMIN — PREDNISONE 40 MG: 20 TABLET ORAL at 12:08

## 2020-01-23 RX ADMIN — POTASSIUM CHLORIDE 10 MEQ: 750 TABLET, EXTENDED RELEASE ORAL at 17:40

## 2020-01-23 RX ADMIN — DOXYCYCLINE 100 MG: 100 CAPSULE ORAL at 08:57

## 2020-01-23 RX ADMIN — LISINOPRIL 10 MG: 10 TABLET ORAL at 08:56

## 2020-01-23 RX ADMIN — LEVETIRACETAM 1500 MG: 500 TABLET, FILM COATED ORAL at 17:40

## 2020-01-23 RX ADMIN — IPRATROPIUM BROMIDE 0.5 MG: 0.5 SOLUTION RESPIRATORY (INHALATION) at 07:48

## 2020-01-23 RX ADMIN — HYDROCODONE BITARTRATE AND HOMATROPINE METHYLBROMIDE 5 ML: 5; 1.5 SOLUTION ORAL at 07:42

## 2020-01-23 NOTE — ASSESSMENT & PLAN NOTE
· Confirmed by CT a imaging showing right upper lobe consolidation concerning for pneumonia  · Patient has completed 5 days of IV antibiotics  ·  Negative cultures, continued symptom control  · She is clinically significantly improved  · Will stop antibiotics at this time

## 2020-01-23 NOTE — ASSESSMENT & PLAN NOTE
· Status post right upper quadrant ultra sound showing potential portal venous hypertension  · Patient is asymptomatic and finding is incidental   · Will repeat LFTs prior to the discharge

## 2020-01-23 NOTE — PROGRESS NOTES
Progress Note - Pulmonary   Ron Felipe 71 y o  female MRN: [id number]  Unit/Bed#: -01 Encounter: [id number]    Assessment:  Exacerbation of COPD  Mucopurulent bronchitis  RUL consolidation  Influenza B  Hypertension  Hyperlipidemia  Diabetes mellitus type 2  Dense left hemiparesis/ischemic encephalopathy  Anemia      Plan:  Oxygen therapy  Taper systemic steroids  Increase activity  Discharge planning for tomorrow    Recommended discharge medications:  DuoNeb q i d  P r n  Trelegy Ellipta 1 puff daily                                                                     Albuterol rescue inhaler p r n  Steroid taper    Chief Complaint:   Wheezing    Subjective: There is considerably less shortness of breath and wheezing today when compared with prior hospital days  There is less frequent cough  Sputum is white yellow in color  There is no fever, chills, or night sweats  She denies chest pain, palpitations, or diaphoresis  Appetite is improved  She denies nausea or vomiting  She is now ambulating about her room without significant oxygen desaturation  Objective:     Vitals: Blood pressure (!) 179/89, pulse (!) 53, temperature 97 9 °F (36 6 °C), resp  rate 16, height 5' 5" (1 651 m), weight 79 6 kg (175 lb 7 8 oz), SpO2 98 %  ,Body mass index is 29 2 kg/m²        Intake/Output Summary (Last 24 hours) at 1/23/2020 1553  Last data filed at 1/23/2020 1335  Gross per 24 hour   Intake 910 ml   Output 1725 ml   Net -815 ml       Invasive Devices     Central Venous Catheter Line            Port A Cath Right Chest -- days                Physical Exam: /90 (BP Location: Left arm)   Pulse 57   Temp 98 °F (36 7 °C) (Oral)   Resp 18   Ht 5' 5" (1 651 m)   Wt 79 6 kg (175 lb 7 8 oz)   SpO2 97%   BMI 29 20 kg/m²     General Appearance:    Alert, cooperative, no distress, appears stated age   Head:    Normocephalic, without obvious abnormality, atraumatic   Eyes:    PERRL, conjunctiva pink  Sclera white  Ears:    Normal    Nose:   Nares normal, septum midline, mucosa normal, no drainage    or sinus tenderness   Throat:   Lips, mucosa, and tongue normal; teeth and gums normal   Neck:   Supple, symmetrical, trachea midline, no adenopathy;     thyroid:  no enlargement/tenderness/nodules; no carotid    bruit or JVD   Back:     Symmetric, no curvature, ROM normal, no CVA tenderness   Lungs:     End expiratory rhonchi bilaterally     Chest Wall:    No tenderness or deformity    Heart:    Regular rate and rhythm, S1 and S2 normal, no murmur, rub   or gallop       Abdomen:     Soft, non-tender, bowel sounds active all four quadrants,     no masses, no organomegaly           Extremities:   Extremities normal, atraumatic, no cyanosis or edema   Pulses:   2+ and symmetric all extremities   Skin:   Skin color, texture, turgor normal, no rashes or lesions   Lymph nodes:   Cervical, supraclavicular, and axillary nodes normal   Neurologic:   CNII-XII intact, normal strength, sensation and reflexes     throughout        Labs:  No new laboratory studies        Imaging and other studies:  No new radiographic studies

## 2020-01-23 NOTE — ASSESSMENT & PLAN NOTE
Lab Results   Component Value Date    HGBA1C 5 7 01/19/2020       Recent Labs     01/22/20  1549 01/22/20  2033 01/23/20  0620 01/23/20  1103   POCGLU 144* 178* 104 124       Blood Sugar Average: Last 72 hrs:  (P) 137 875  Glucose levels with good control, continue with current management

## 2020-01-23 NOTE — SOCIAL WORK
CM spoke to pt and daughter in law Lex Anna at bedside  Ins[place] not approve a [place] facility  Both Lance and Juanita attempted to get an out of state approval, but failed  Pt needs to go to a [place] facility  After discussion, it was decided that pt will be discharged home with family support  She will be moving to [place] to live with her daughter Jocy-this is already scheduled for between 2/1-2/4/2020  Pt is a tentative d/c for tomorrow  Steroids are being decreased

## 2020-01-23 NOTE — ASSESSMENT & PLAN NOTE
· Patient with a known history of COPD requiring 7 intubations previously  · Currently with only scattered mild wheezing throughout lungs  ·  Currently on Xopenex and Atrovent  She was receiving IV steroids, but today clinically she is significantly improved and who wheezing is only minimal   Switched to oral prednisone  Will observe overnight and discharged home on a taper course of prednisone

## 2020-01-23 NOTE — PROGRESS NOTES
Progress Note - Juwan Calvo 1950, 71 y o  female MRN: [id number]    Unit/Bed#: -01 Encounter: [id number]    Primary Care Provider: No primary care provider on file  Date and time admitted to hospital: 1/18/2020 12:00 PM        Community acquired pneumonia  Assessment & Plan  · Confirmed by CT a imaging showing right upper lobe consolidation concerning for pneumonia  · Patient has completed 5 days of IV antibiotics  ·  Negative cultures, continued symptom control  · She is clinically significantly improved  · Will stop antibiotics at this time  Elevated LFTs  Assessment & Plan  · Status post right upper quadrant ultra sound showing potential portal venous hypertension  · Patient is asymptomatic and finding is incidental   · Will repeat LFTs prior to the discharge    Diabetes mellitus [facility])  Assessment & Plan  Lab Results   Component Value Date    HGBA1C 5 7 01/19/2020       Recent Labs     01/22/20  1549 01/22/20  2033 01/23/20  0620 01/23/20  1103   POCGLU 144* 178* 104 124       Blood Sugar Average: Last 72 hrs:  (P) 137 875  Glucose levels with good control, continue with current management    Influenza A  Assessment & Plan  · Status post treatment with Tamiflu as an outpatient  · No indication further treatment at this time  · Has superimposed bacterial pneumonia and right upper lobe  · Completed antibiotics for community-acquired pneumonia      * COPD with acute exacerbation ([facility] [address] )  Assessment & Plan  · Patient with a known history of COPD requiring 7 intubations previously  · Currently with only scattered mild wheezing throughout lungs  ·  Currently on Xopenex and Atrovent  She was receiving IV steroids, but today clinically she is significantly improved and who wheezing is only minimal   Switched to oral prednisone  Will observe overnight and discharged home on a taper course of prednisone            VTE Pharmacologic Prophylaxis:   Pharmacologic: Enoxaparin (Lovenox)  Mechanical VTE Prophylaxis in Place: Yes    Patient Centered Rounds: I have performed bedside rounds with nursing staff today  Discussions with Specialists or Other Care Team Provider:  Nursing    Education and Discussions with Family / Patient:  Patient    Time Spent for Care: 20 minutes  More than 50% of total time spent on counseling and coordination of care as described above  Current Length of Stay: 5 day(s)    Current Patient Status: Inpatient   Certification Statement: The patient will continue to require additional inpatient hospital stay due to COPD exacerbation    Discharge Plan:  24 hours    Code Status: Level 1 - Full Code      Subjective:   Patient was seen and examined  She reports feeling very well today  She is trying to walk with the physical therapist to see how she can function with her respiratory stated with exertion  Objective:     Vitals:   Temp (24hrs), Av 7 °F (36 5 °C), Min:97 5 °F (36 4 °C), Max:97 9 °F (36 6 °C)    Temp:  [97 5 °F (36 4 °C)-97 9 °F (36 6 °C)] 97 9 °F (36 6 °C)  HR:  [53-55] 53  Resp:  [16-19] 16  BP: (170-179)/(82-89) 179/89  SpO2:  [96 %-98 %] 98 %  Body mass index is 29 2 kg/m²  Input and Output Summary (last 24 hours): Intake/Output Summary (Last 24 hours) at 2020 1551  Last data filed at 2020 1335  Gross per 24 hour   Intake 910 ml   Output 1725 ml   Net -815 ml       Physical Exam:     Physical Exam   Constitutional: She appears well-developed and well-nourished  Cardiovascular: Normal rate and regular rhythm  Pulmonary/Chest: Effort normal and breath sounds normal    Scattered bronchial wheezing   Abdominal: Soft  She exhibits no distension  Musculoskeletal: She exhibits no edema  Neurological: She is alert  She has normal strength  Skin: Skin is warm  No erythema  Psychiatric: She has a normal mood and affect   Her behavior is normal        Additional Data:     Labs:    Results from last 7 days   Lab Units 20  0625   WBC Thousand/uL 4 77   HEMOGLOBIN g/dL 11 7   HEMATOCRIT % 36 7   PLATELETS Thousands/uL 246   NEUTROS PCT % 76*   LYMPHS PCT % 18   MONOS PCT % 5   EOS PCT % 0     Results from last 7 days   Lab Units 01/22/20  0625  01/20/20  0607   SODIUM mmol/L 142   < > 143   POTASSIUM mmol/L 3 4*   < > 4 0   CHLORIDE mmol/L 105   < > 110*   CO2 mmol/L 29   < > 26   BUN mg/dL 14   < > 12   CREATININE mg/dL 0 71   < > 0 77   ANION GAP mmol/L 8   < > 7   CALCIUM mg/dL 8 4   < > 8 5   ALBUMIN g/dL 2 5*   < > 2 4*  2 3*   TOTAL BILIRUBIN mg/dL  --   --  0 30   ALK PHOS U/L  --   --  263*   ALT U/L  --   --  72   AST U/L  --   --  48*   GLUCOSE RANDOM mg/dL 129   < > 127    < > = values in this interval not displayed  Results from last 7 days   Lab Units 01/20/20  1051   INR  1 09     Results from last 7 days   Lab Units 01/23/20  1103 01/23/20  0620 01/22/20  2033 01/22/20  1549 01/22/20  1432 01/22/20  1148 01/22/20  0630 01/21/20  2043 01/21/20  1829 01/21/20  1559 01/21/20  1115 01/21/20  0613   POC GLUCOSE mg/dl 124 104 178* 144* 197* 117 122 185* 142* 171* 135 105     Results from last 7 days   Lab Units 01/19/20  0538   HEMOGLOBIN A1C % 5 7     Results from last 7 days   Lab Units 01/18/20  1901 01/18/20  1241   LACTIC ACID mmol/L  --  1 4   PROCALCITONIN ng/ml 0 06  --            * I Have Reviewed All Lab Data Listed Above  * Additional Pertinent Lab Tests Reviewed: All Labs Within Last 24 Hours Reviewed    Imaging:      Recent Cultures (last 7 days):     Results from last 7 days   Lab Units 01/18/20  1257 01/18/20  1241   BLOOD CULTURE  No Growth After 4 Days  No Growth After 4 Days         Last 24 Hours Medication List:     Current Facility-Administered Medications:  acetaminophen 650 mg Oral Q6H PRN Shavkat Ruziev   acetaminophen 650 mg Oral Q6H PRN Shavkat Ruziev   albuterol 2 5 mg Nebulization Q6H PRN Malena Grant MD   benzonatate 100 mg Oral TID PRN Michelle Lamas   dextromethorphan-guaiFENesin 10 mL Oral Q4H PRN [facility]   docusate sodium 200 mg Oral BID [facility] Dereckoctavio   enoxaparin 40 mg Subcutaneous Daily [facility] Cydney   fluticasone 1 spray Each Nare Daily Audie Nixon MD   folic acid 1 mg Oral Daily [facility] Cydney   HYDROcodone-homatropine 5 mL Oral Q4H PRN Audie Nixon MD   insulin lispro 1-5 Units Subcutaneous 4 times day [facility] Cydney   ipratropium 0 5 mg Nebulization TID Palak Lorenzo MD   lactulose 10 g Oral Daily [facility] Cydney   levalbuterol 1 25 mg Nebulization TID Palak Lorenzo MD   levETIRAcetam 1,500 mg Oral BID [facility] Timothy   lisinopril 10 mg Oral Daily [facility] Timothy   montelukast 10 mg Oral Daily [facility] Timothy   ondansetron 4 mg Intravenous Q4H PRN Audie Nixon MD   pantoprazole 40 mg Oral Early Morning [facility] Timothy   polyethylene glycol 17 g Oral BID [facility] Timothy   potassium chloride 10 mEq Oral BID [facility] Timothy   predniSONE 40 mg Oral Daily Palak Lorenzo MD   zolpidem 5 mg Oral HS PRN Wilbur Newman MD        Today, Patient Was Seen By: Palak Lorenzo MD    ** Please Note: Dictation voice to text software may have been used in the creation of this document   **

## 2020-01-23 NOTE — PLAN OF CARE
Problem: PHYSICAL THERAPY ADULT  Goal: Performs mobility at highest level of function for planned discharge setting  See evaluation for individualized goals  Description  Treatment/Interventions: Functional transfer training, Therapeutic exercise, Endurance training, Patient/family training, Equipment eval/education, Gait training, Spoke to nursing, OT, LE strengthening/ROM, Elevations, Bed mobility          See flowsheet documentation for full assessment, interventions and recommendations  Outcome: Progressing  Note:   Prognosis: Good  Problem List: Decreased strength, Decreased endurance, Impaired balance, Decreased mobility, Impaired sensation, Impaired vision, Pain  Assessment: Pt seen for PT treatment session this date with interventions consisting of gait training w/ emphasis on improving pt's ability to ambulate level surfaces x 30' + 15' with min A provided by therapist with RW and therapeutic activity consisting of training: supine<>sit transfers and sit<>stand transfers  Pt agreeable to PT treatment session upon arrival, pt found supine in bed w/ HOB elevated, in no apparent distress, A&O x 4 and responsive  In comparison to previous session, pt with improvements in endurance as evidenced by distance ambulated on level surfaces using RW; however, pt continues to require seated rest breaks between ambulation trials secondary to general deconditioning, fatigue and acute vs  chronic pain  Post session: pt returned BTB, bed alarm engaged, all needs in reach and RN notified of session findings/recommendations  Continue to recommend STR at time of d/c in order to maximize pt's functional independence and safety w/ mobility  Pt continues to be functioning below baseline level, and remains limited 2* factors listed above  PT will continue to see pt while here in order to address the deficits listed above and provide interventions consistent w/ POC in effort to achieve STGs    Barriers to Discharge: Inaccessible home environment, Decreased caregiver support     Recommendation: Short-term skilled PT     PT - OK to Discharge: Yes(when medically cleared; if to STR)    See flowsheet documentation for full assessment

## 2020-01-23 NOTE — PHYSICAL THERAPY NOTE
Physical Therapy Treatment Note       01/23/20 1549   Pain Assessment   Pain Assessment No/denies pain   Pain Type Chronic pain;Acute pain   Pain Location Leg;Knee   Pain Orientation Left   Hospital Pain Intervention(s) Repositioned;Relaxation technique   Diversional Activities Television   Pain Rating: FLACC (Rest) - Face 0   Pain Rating: FLACC (Rest) - Legs 0   Pain Rating: FLACC (Rest) - Activity 0   Pain Rating: FLACC (Rest) - Cry 0   Pain Rating: FLACC (Rest) - Consolability 0   Score: FLACC (Rest) 0   Pain Rating: FLACC (Activity) - Face 1   Pain Rating: FLACC (Activity) - Legs 0   Pain Rating: FLACC (Activity) - Activity 0   Pain Rating: FLACC (Activity) - Cry 1   Pain Rating: FLACC (Activity) - Consolability 1   Score: FLACC (Activity) 3   Restrictions/Precautions   Weight Bearing Precautions Per Order No   Braces or Orthoses Splint;AFO  (L UE resting hand splint; L LE AFO for foot drop; AFO present, however, proper footwear not present to utilize brace safely at this time)   Other Precautions Multiple lines; Fall Risk;Pain;Bed Alarm; Chair Alarm;Seizure  (hx of seizures; back safety precautions)   General   Chart Reviewed Yes   Response to Previous Treatment Patient with no complaints from previous session  Family/Caregiver Present No   Cognition   Overall Cognitive Status WFL   Arousal/Participation Alert; Cooperative   Attention Within functional limits   Orientation Level Oriented X4   Memory Within functional limits   Following Commands Follows all commands and directions without difficulty   Comments pt agreeable to PT treatment   Subjective   Subjective "I am not able to go to rehab"   Bed Mobility   Supine to Sit 4  Minimal assistance   Additional items Assist x 1; Increased time required;LE management;Verbal cues;HOB elevated   Sit to Supine 4  Minimal assistance   Additional items Assist x 1; Increased time required;HOB elevated;Verbal cues;LE management   Transfers   Sit to Stand 4  Minimal assistance   Additional items Assist x 1; Increased time required;Verbal cues;Armrests   Stand to Sit 4  Minimal assistance   Additional items Assist x 1; Increased time required;Verbal cues;Armrests   Ambulation/Elevation   Gait pattern L Knee Андрей;Decreased foot clearance; Excessively slow; Step to;Short stride;L Hemiparesis   Gait Assistance 4  Minimal assist   Additional items Assist x 1;Verbal cues   Assistive Device Rolling walker   Distance 30' + 15'  (seated rest break required; chair follow)   Stair Management Assistance Not tested   Balance   Static Sitting Good   Dynamic Sitting Fair +   Static Standing Fair   Dynamic Standing Fair -   Ambulatory Poor +   Endurance Deficit   Endurance Deficit Yes   Activity Tolerance   Activity Tolerance Patient limited by pain; Patient limited by fatigue   Medical Staff Made Aware SPT Katie   Nurse Made Aware RN Luis Pak confirmed pt appropriate for therapy; made aware of session outcomes   Assessment   Prognosis Good   Problem List Decreased strength;Decreased endurance; Impaired balance;Decreased mobility; Impaired sensation; Impaired vision;Pain   Assessment Pt seen for PT treatment session this date with interventions consisting of gait training w/ emphasis on improving pt's ability to ambulate level surfaces x 30' + 15' with min A provided by therapist with RW and therapeutic activity consisting of training: supine<>sit transfers and sit<>stand transfers  Pt agreeable to PT treatment session upon arrival, pt found supine in bed w/ HOB elevated, in no apparent distress, A&O x 4 and responsive  In comparison to previous session, pt with improvements in endurance as evidenced by distance ambulated on level surfaces using RW; however, pt continues to require seated rest breaks between ambulation trials secondary to general deconditioning, fatigue and acute vs  chronic pain   Post session: pt returned BTB, bed alarm engaged, all needs in reach and RN notified of session findings/recommendations  Continue to recommend STR at time of d/c in order to maximize pt's functional independence and safety w/ mobility  Pt continues to be functioning below baseline level, and remains limited 2* factors listed above  PT will continue to see pt while here in order to address the deficits listed above and provide interventions consistent w/ POC in effort to achieve STGs  Barriers to Discharge Inaccessible home environment;Decreased caregiver support   Goals   Patient Goals To move to [place] to be close to daughter and grandson   STG Expiration Date 01/30/20   PT Treatment Day 2   Plan   Treatment/Interventions Functional transfer training;LE strengthening/ROM; Elevations; Endurance training; Therapeutic exercise;Patient/family training;Equipment eval/education;Gait training;Bed mobility;Spoke to nursing   Progress Slow progress, decreased activity tolerance   PT Frequency Other (Comment)  (3-5x/wk)   Recommendation   Recommendation Short-term skilled PT   PT - OK to Discharge Yes  (when medically cleared; if to STR)       Leatha Garduno, PT, DPT    Time of PT treatment session: 15:23-15:49  26 minutes

## 2020-01-23 NOTE — ASSESSMENT & PLAN NOTE
· Status post treatment with Tamiflu as an outpatient  · No indication further treatment at this time  · Has superimposed bacterial pneumonia and right upper lobe  · Completed antibiotics for community-acquired pneumonia

## 2020-01-24 VITALS
DIASTOLIC BLOOD PRESSURE: 73 MMHG | OXYGEN SATURATION: 95 % | TEMPERATURE: 97.2 F | BODY MASS INDEX: 28.17 KG/M2 | SYSTOLIC BLOOD PRESSURE: 147 MMHG | HEIGHT: 65 IN | HEART RATE: 64 BPM | RESPIRATION RATE: 20 BRPM | WEIGHT: 169.09 LBS

## 2020-01-24 LAB
ALBUMIN SERPL BCP-MCNC: 2.5 G/DL (ref 3.5–5)
ALP SERPL-CCNC: 236 U/L (ref 46–116)
ALT SERPL W P-5'-P-CCNC: 171 U/L (ref 12–78)
AST SERPL W P-5'-P-CCNC: 116 U/L (ref 5–45)
BACTERIA BLD CULT: NORMAL
BACTERIA BLD CULT: NORMAL
BILIRUB DIRECT SERPL-MCNC: 0.13 MG/DL (ref 0–0.2)
BILIRUB SERPL-MCNC: 0.4 MG/DL (ref 0.2–1)
GLUCOSE SERPL-MCNC: 72 MG/DL (ref 65–140)
GLUCOSE SERPL-MCNC: 73 MG/DL (ref 65–140)
PROT SERPL-MCNC: 6.3 G/DL (ref 6.4–8.2)

## 2020-01-24 PROCEDURE — 82948 REAGENT STRIP/BLOOD GLUCOSE: CPT

## 2020-01-24 PROCEDURE — 94640 AIRWAY INHALATION TREATMENT: CPT

## 2020-01-24 PROCEDURE — 80076 HEPATIC FUNCTION PANEL: CPT | Performed by: FAMILY MEDICINE

## 2020-01-24 PROCEDURE — 99239 HOSP IP/OBS DSCHRG MGMT >30: CPT | Performed by: FAMILY MEDICINE

## 2020-01-24 PROCEDURE — 97535 SELF CARE MNGMENT TRAINING: CPT

## 2020-01-24 PROCEDURE — 94760 N-INVAS EAR/PLS OXIMETRY 1: CPT

## 2020-01-24 PROCEDURE — 99232 SBSQ HOSP IP/OBS MODERATE 35: CPT | Performed by: INTERNAL MEDICINE

## 2020-01-24 RX ORDER — PREDNISONE 10 MG/1
TABLET ORAL
Qty: 30 TABLET | Refills: 0 | Status: SHIPPED | OUTPATIENT
Start: 2020-01-24

## 2020-01-24 RX ORDER — ALBUTEROL SULFATE 90 UG/1
2 AEROSOL, METERED RESPIRATORY (INHALATION) EVERY 6 HOURS PRN
Qty: 6.7 G | Refills: 0 | Status: SHIPPED | OUTPATIENT
Start: 2020-01-24

## 2020-01-24 RX ORDER — LISINOPRIL 10 MG/1
10 TABLET ORAL DAILY
Qty: 30 TABLET | Refills: 0 | Status: SHIPPED | OUTPATIENT
Start: 2020-01-24

## 2020-01-24 RX ADMIN — FLUTICASONE PROPIONATE 1 SPRAY: 50 SPRAY, METERED NASAL at 09:16

## 2020-01-24 RX ADMIN — IPRATROPIUM BROMIDE 0.5 MG: 0.5 SOLUTION RESPIRATORY (INHALATION) at 07:30

## 2020-01-24 RX ADMIN — LEVETIRACETAM 1500 MG: 500 TABLET, FILM COATED ORAL at 09:15

## 2020-01-24 RX ADMIN — HYDROCODONE BITARTRATE AND HOMATROPINE METHYLBROMIDE 5 ML: 5; 1.5 SOLUTION ORAL at 06:05

## 2020-01-24 RX ADMIN — DOCUSATE SODIUM 200 MG: 100 CAPSULE, LIQUID FILLED ORAL at 09:14

## 2020-01-24 RX ADMIN — ENOXAPARIN SODIUM 40 MG: 40 INJECTION SUBCUTANEOUS at 09:14

## 2020-01-24 RX ADMIN — PANTOPRAZOLE SODIUM 40 MG: 40 TABLET, DELAYED RELEASE ORAL at 06:00

## 2020-01-24 RX ADMIN — BENZONATATE 100 MG: 100 CAPSULE ORAL at 06:05

## 2020-01-24 RX ADMIN — MONTELUKAST SODIUM 10 MG: 10 TABLET, FILM COATED ORAL at 09:15

## 2020-01-24 RX ADMIN — LACTULOSE 10 G: 10 SOLUTION ORAL at 09:14

## 2020-01-24 RX ADMIN — LEVALBUTEROL HYDROCHLORIDE 1.25 MG: 1.25 SOLUTION, CONCENTRATE RESPIRATORY (INHALATION) at 07:30

## 2020-01-24 RX ADMIN — PREDNISONE 40 MG: 20 TABLET ORAL at 09:15

## 2020-01-24 RX ADMIN — FOLIC ACID 1 MG: 1 TABLET ORAL at 09:14

## 2020-01-24 RX ADMIN — POTASSIUM CHLORIDE 10 MEQ: 750 TABLET, EXTENDED RELEASE ORAL at 09:15

## 2020-01-24 RX ADMIN — LISINOPRIL 10 MG: 10 TABLET ORAL at 09:15

## 2020-01-24 NOTE — SOCIAL WORK
Pt to be discharged home w/no needs  Daughter in law Luis Enrique Araujo will transport home  Pt will be moving to [place] the first week of February  IMM reviewed and signed by pt  Copy to pt and copy to MR for scanning

## 2020-01-24 NOTE — PROGRESS NOTES
Progress Note - Pulmonary   Katerina Huitron 71 y o  female MRN: [id number]  Unit/Bed#: -01 Encounter: [id number]    Assessment:  Exacerbation of chronic obstructive lung disease  Chronic bronchitis  Resolving pneumonia  Influenza B  Ischemic encephalopathy  Essential hypertension  Hyperlipidemia  Diabetes mellitus    Plan:  Discharge planning in progress  Steroid taper  Increase activity  Discharge meds to include:  DuoNeb, Trelegy, steroid taper    She is planning to relocate to [facility] in close proximity to family members  She is advised to seek a pulmonologist upon her arrival    Chief Complaint:   Shortness of breath    Subjective:   Shortness of breath and wheezing are less pronounced  There is infrequent cough productive of white sputum  There is no fever, chills, night sweats  She denies chest pain, palpitations, or diaphoresis  There is generalized weakness  She requires assistance with ambulation  Appetite and oral intake has improved  Lower extremity edema has nearly resolved  Objective:     Vitals: Blood pressure 150/69, pulse 60, temperature 98 1 °F (36 7 °C), resp  rate 14, height 5' 5" (1 651 m), weight 76 7 kg (169 lb 1 5 oz), SpO2 98 %  ,Body mass index is 28 14 kg/m²        Intake/Output Summary (Last 24 hours) at 1/24/2020 1027  Last data filed at 1/24/2020 0855  Gross per 24 hour   Intake 660 ml   Output 650 ml   Net 10 ml       Invasive Devices     Central Venous Catheter Line            Port A Cath Right Chest -- days                Physical Exam: /69   Pulse 60   Temp 98 1 °F (36 7 °C)   Resp 14   Ht 5' 5" (1 651 m)   Wt 76 7 kg (169 lb 1 5 oz)   SpO2 98%   BMI 28 14 kg/m²     General Appearance:    Alert, cooperative, no distress, appears stated age   Head:    Normocephalic, without obvious abnormality, atraumatic   Eyes:    PERRL, conjunctiva and sclera are normal    Ears:    Normal    Nose:   Nares normal, septum midline, mucosa normal, no drainage or sinus tenderness   Throat:   Lips, mucosa, and tongue normal; teeth and gums normal   Neck:   Supple, symmetrical, trachea midline, no adenopathy;     thyroid:  no enlargement/tenderness/nodules; no carotid    bruit or JVD   Back:     Symmetric, kyphosis   Lungs:     End expiratory rhonchi bilaterally  Chest Wall:    No tenderness or deformity    Heart:    Regular rate and rhythm, S1 and S2 normal, no murmur, rub   or gallop   Breast Exam:    No tenderness, masses, or nipple abnormality   Abdomen:     Soft, non-tender, bowel sounds active all four quadrants,     no masses, no organomegaly           Extremities:   Extremities normal, atraumatic, no cyanosis or edema   Pulses:   2+ and symmetric all extremities   Skin:   Skin color, texture, turgor normal, no rashes or lesions   Lymph nodes:   Cervical, supraclavicular, and axillary nodes normal   Neurologic:   CNII-XII intact, generalized weakness  No sensory impairment  Unstable gait  Labs:    Total Bilirubin 0 20 - 1 00 mg/dL 0 40    Bilirubin, Direct 0 00 - 0 20 mg/dL 0 13    Alkaline Phosphatase 46 - 116 U/L 236High     AST 5 - 45 U/L 116High        ALT 12 - 78 U/L 171High        Total Protein 6 4 - 8 2 g/dL 6 3Low     Albumin 3 5 - 5 0 g/dL 2 5Low             Imaging and other studies:   No new radiographic studies

## 2020-01-24 NOTE — OCCUPATIONAL THERAPY NOTE
OccupationalTherapy Progress Note     Patient Name: Jony Gallardo  Today's Date: 1/24/2020  Problem List  Principal Problem:    COPD with acute exacerbation [facility])  Active Problems:    CVA (cerebral vascular accident) ([facility] [address] )    Hypertension    Influenza A    Diabetes mellitus ([facility] [address] )    Elevated LFTs    Community acquired pneumonia     Addendum: *time correction, pt seen from 9483-5057     01/24/20 6412   Restrictions/Precautions   Weight Bearing Precautions Per Order No   Other Precautions Fall Risk; Chair Alarm; Bed Alarm;Seizure   General   Response to Previous Treatment Patient with no complaints from previous session   Pain Assessment   Pain Assessment No/denies pain   Pain Score No Pain   ADL   UB Dressing Assistance 4  Minimal Assistance   UB Dressing Deficit Thread LUE;Verbal cueing; Increased time to complete   UB Dressing Comments Educated pt on technique to don overhead shirt by first donning L UE, placing over head, then threading R UE    LB Dressing Assistance 4  Minimal Assistance   LB Dressing Deficit Thread LLE into pants; Thread RLE into pants;Use of adaptive equipment;Supervision/safety;Verbal cueing   LB Dressing Comments Educated pt on use of LH reacher to don pants  Educated on technique to don weaker L LE first    Bed Mobility   Supine to Sit 5  Supervision   Additional items Assist x 1; Increased time required;HOB elevated; Bedrails   Sit to Supine 4  Minimal assistance   Additional items Assist x 1; Increased time required;Verbal cues;LE management;HOB elevated   Additional Comments Pt supine in bed at start of session and agreeable to OT  Pt supine in bed at end of session with all needs met, call bell within reach, and bed alarm active  Coordination   Fine Motor Impaired L hand, however pt able to grasp waist band of pants to assist in pulling up toward hips  Cognition   Overall Cognitive Status WFL   Arousal/Participation Alert; Cooperative   Attention Within functional limits   Orientation Level Oriented X4   Memory Within functional limits   Following Commands Follows all commands and directions without difficulty   Comments Pt identified by full name and birthdate via ID bracelet  Activity Tolerance   Activity Tolerance Patient limited by fatigue   Medical Staff Made Aware JENNIFER Machado verbalized pt appropriate for OT  Notified of outcomes   Assessment   Assessment Patient participated in Skilled OT session (time 9896-2857) this date with interventions consisting of ADL retraining with the use of correct body mechanics, energy conservation technique education, long handled adaptive equipment training and increase OOB and sitting tolerance  Patient agreeable to OT treatment session, upon arrival patient was found supine in bed  In comparison to previous session, patient with improvements in LB dressing and bed mobility*   Educated pt on use of long handled reacher to don pants while seated EOB  Pt required Min A for proper use of LHAE and for technique to don L LE first to increase independence  Educated pt on technique to don/doff overhead night gown  Pt required Min A to complete  Educated pt on energy conservation technique to take breaks in between each small dressing task if feeling fatigued  Pt verbalized understanding  Patient requiring verbal cues for correct technique and frequent rest periods  Patient continues to be functioning below baseline level, occupational performance remains limited secondary to factors listed above and increased risk for falls and injury  From OT standpoint, recommendation at time of d/c would be short term rehab  Patient to benefit from continued Occupational Therapy treatment while in the hospital to address deficits as defined above and maximize level of functional independence with ADLs and functional mobility  Plan   Treatment Interventions ADL retraining;Functional transfer training;UE strengthening/ROM; Endurance training;Patient/family training;Equipment evaluation/education; Compensatory technique education; Fine motor coordination activities;Continued evaluation; Energy conservation; Activityengagement   Goal Expiration Date 01/30/20   OT Treatment Day 1   OT Frequency 3-5x/wk   Recommendation   OT Discharge Recommendation Short Term Rehab   OT - OK to Discharge   (once medically cleared)   Barthel Index   Feeding 5   Bathing 0   Grooming Score 0   Dressing Score 5   Bladder Score 10   Bowels Score 10   Toilet Use Score 5   Transfers (Bed/Chair) Score 10   Mobility (Level Surface) Score 0   Stairs Score 0   Barthel Index Score 45   Modified Sulphur Scale   Modified Anjel Scale 4       Addendum: *time correction, pt seen from [address], OTR/L

## 2020-01-24 NOTE — PLAN OF CARE
Problem: OCCUPATIONAL THERAPY ADULT  Goal: Performs self-care activities at highest level of function for planned discharge setting  See evaluation for individualized goals  Description  Treatment Interventions: ADL retraining, Functional transfer training, UE strengthening/ROM, Endurance training, Patient/family training, Equipment evaluation/education, Neuromuscular reeducation, Fine motor coordination activities, Compensatory technique education, Continued evaluation, Energy conservation, Activityengagement          See flowsheet documentation for full assessment, interventions and recommendations  Outcome: Progressing  Note:   Limitation: Decreased ADL status, Decreased UE ROM, Decreased UE strength, Decreased endurance, Decreased fine motor control, Decreased high-level ADLs, Decreased sensation, Decreased self-care trans, Non-func L UE     Assessment: Patient participated in Skilled OT session (time 3006-0473) this date with interventions consisting of ADL retraining with the use of correct body mechanics, energy conservation technique education, long handled adaptive equipment training and increase OOB and sitting tolerance  Patient agreeable to OT treatment session, upon arrival patient was found supine in bed  In comparison to previous session, patient with improvements in LB dressing and bed mobility*   Educated pt on use of long handled reacher to don pants while seated EOB  Pt required Min A for proper use of LHAE and for technique to don L LE first to increase independence  Educated pt on technique to don/doff overhead night gown  Pt required Min A to complete  Educated pt on energy conservation technique to take breaks in between each small dressing task if feeling fatigued  Pt verbalized understanding  Patient requiring verbal cues for correct technique and frequent rest periods   Patient continues to be functioning below baseline level, occupational performance remains limited secondary to factors listed above and increased risk for falls and injury  From OT standpoint, recommendation at time of d/c would be short term rehab  Patient to benefit from continued Occupational Therapy treatment while in the hospital to address deficits as defined above and maximize level of functional independence with ADLs and functional mobility       Addendum: *time correction, pt seen from 4925-4459     OT Discharge Recommendation: Short Term Rehab  OT - OK to Discharge: (once medically cleared)

## 2020-01-25 DIAGNOSIS — R79.89 ELEVATED LFTS: Primary | ICD-10-CM

## 2020-01-25 NOTE — ASSESSMENT & PLAN NOTE
· Status post right upper quadrant ultra sound showing potential portal venous hypertension  · Per GI recommendations, patient should stop statin medication upon discharge

## 2020-01-25 NOTE — ASSESSMENT & PLAN NOTE
Lab Results   Component Value Date    HGBA1C 5 7 01/19/2020       Recent Labs     01/23/20  1549 01/23/20 2055 01/24/20  0529 01/24/20  1054   POCGLU 174* 124 73 72       Blood Sugar Average: Last 72 hrs:  (P) 129 [id number]  Glucose levels with good control, continue with current management

## 2020-01-25 NOTE — DISCHARGE SUMMARY
Discharge-  Comp 1950, 71 y o  female MRN: [id number]    Unit/Bed#: -01 Encounter: [id number]    Primary Care Provider: No primary care provider on file  Date and time admitted to hospital: 1/18/2020 12:00 PM        Community acquired pneumonia  Assessment & Plan  · Confirmed by CT a imaging showing right upper lobe consolidation concerning for pneumonia  · Patient has completed 5 days of IV antibiotics  ·  Negative cultures, continued symptom control  · She is clinically significantly improved  · Will stop antibiotics at this time  Elevated LFTs  Assessment & Plan  · Status post right upper quadrant ultra sound showing potential portal venous hypertension  · Per GI recommendations, patient should stop statin medication upon discharge      Diabetes mellitus [facility])  Assessment & Plan  Lab Results   Component Value Date    HGBA1C 5 7 01/19/2020       Recent Labs     01/23/20  1549 01/23/20  2055 01/24/20  0529 01/24/20  1054   POCGLU 174* 124 73 72       Blood Sugar Average: Last 72 hrs:  (P) 129 [id number]  Glucose levels with good control, continue with current management    Influenza A  Assessment & Plan  · Status post treatment with Tamiflu as an outpatient  · No indication further treatment at this time  · Has superimposed bacterial pneumonia and right upper lobe  · Completed antibiotics for community-acquired pneumonia      Hypertension  Assessment & Plan  · Blood pressure relatively well controlled, continue with home dose lisinopril 10 mg daily    CVA (cerebral vascular accident) ([facility] [address] )  Assessment & Plan  · History of ischemic CVA back in 12/2017, status post rehab  · Still has residual left-sided hemiparesis  · Continue with high-intensity statin, blood pressure control  · Will continue with PT and OT along with fall precaution  · Patient defers short-term rehabilitation at this time because she is going to move to [place] to live with her daughter    * COPD with acute exacerbation [facility])  Assessment & Plan  · Patient with a known history of COPD requiring 7 intubations previously  · Currently, completely normal lung exam with resolved wheezing  · Patient has been seen by pulmonology and will be discharged with the initiation of Trelogy and taper course of steroids  Discharge Summary - [facility] Internal Medicine    Patient Information: Shahla Keen 71 y o  female MRN: [id number]  Unit/Bed#: -01 Encounter: [id number]    Discharging Physician / Practitioner: Juwan Salmeron MD  PCP: No primary care provider on file  Admission Date: 1/18/2020  Discharge Date: 01/24/20    Reason for Admission:  COPD exacerbation    Discharge Diagnoses:     Principal Problem:    COPD with acute exacerbation [facility])  Active Problems:    CVA (cerebral vascular accident) ([facility] [address] )    Hypertension    Influenza A    Diabetes mellitus ([facility] [address] )    Elevated LFTs    Community acquired pneumonia  Resolved Problems:    * No resolved hospital problems  *      Consultations During Hospital Stay:  · Pulmonary  · GI    Procedures Performed:     · None    Significant Findings / Test Results:     Ultrasound of the right upper quadrant: Slow velocities within the intrahepatic portal veins, concerning for portal venous hypertension  CTA of the chest:   Patchy consolidation in the dependent portion of the right upper lobe compatible with pneumonia  The dependent distribution raises the possibility of aspiration    Incidental Findings:   · none    Test Results Pending at Discharge (will require follow up):   · none     Outpatient Tests Requested:  · CMP on 5/20/64    Complications:  none    Hospital Course:     Shahla Keen is a 71 y o  female patient who originally presented to the hospital on 1/18/2020 due to ongoing cough runny nose generalized malaise and fatigue  She reports symptoms started approximately 3 days ago    Reports 1 episode of fever but was not able to measure temperature failed or denies chest pain denies syncope denies shortness of breath  Chest x-ray was unremarkable emergency department however patient was found to be in mild respiratory distress with continued cough and shortness of breath and was unable to give appropriate answers during interview without getting short of breath and coughing  Other than that denies fever chills denies changes in bowel or urinary habits  Patient had CT scan of the chest done and it revealed right upper lobe consolidation compatible with pneumonia  Patient has completed a 5 day course of antibiotics for pneumonia  In addition, patient was diagnosed with influenza B  She has been treated with Tamiflu prior to the admission  Clinically, patient has improved significantly in terms of wheezing and respiratory status  She does have history of COPD and will need to continue taper course of steroids as well as will start on the maintenance treatment with Trelogy  Of note, patient was found to have transaminitis on admission  She had ultrasound of the liver done which revealed portal hypertension  Patient denies any nausea or abdominal pain  She was seen by gastroenterology during the hospitalization and it has been decided that she stops a statin upon discharge  LFTs has been recheck to on the day of discharge and they have being elevated  In view of patient being clinically significantly improved, she will be discharged at this time with a close GI follow-up within the next week  I recommended that the patient repeats LFTs on 01/27/2020  Patient did reveal to me that she has a history of primary sclerosing cholangitis  Also, she reports that she has been advised by her gastroenterologist to take vitamin-A supplementation  I discussed with the patient that she should readdressed vitamin-A supplementation and the need for statin use when she sees a gastroenterologist next time          Condition at Discharge: stable     Discharge Day Visit / Exam: Subjective:  Patient was seen and examined today  She is feeling well and reports that she is ready to go home  She denies difficulty breathing  Vitals: Blood Pressure: 147/73 (01/24/20 1037)  Pulse: 64 (01/24/20 1037)  Temperature: (!) 97 2 °F (36 2 °C) (01/24/20 1037)  Temp Source: Oral (01/23/20 1500)  Respirations: 20 (01/24/20 1037)  Height: 5' 5" (165 1 cm) (01/18/20 1738)  Weight - Scale: 76 7 kg (169 lb 1 5 oz) (01/24/20 0544)  SpO2: 95 % (01/24/20 1037)  Exam:   Physical Exam   Constitutional: She appears well-developed and well-nourished  Cardiovascular: Normal rate and regular rhythm  Pulmonary/Chest: Effort normal and breath sounds normal    Abdominal: Soft  She exhibits no distension  Musculoskeletal: She exhibits no edema  Neurological: She is alert  She has normal strength  Skin: Skin is warm  No erythema  Psychiatric: She has a normal mood and affect  Her behavior is normal        Discussion with Family: no  Discharge instructions/Information to patient and family:   See after visit summary for information provided to patient and family  Provisions for Follow-Up Care:  See after visit summary for information related to follow-up care and any pertinent home health orders  Disposition:     Home    For Discharges to [facility]:   · Not Applicable to this Patient - Not Applicable to this Patient    Planned Readmission: no     Discharge Statement:  I spent 40 minutes discharging the patient  This time was spent on the day of discharge  I had direct contact with the patient on the day of discharge  Greater than 50% of the total time was spent examining patient, answering all patient questions, arranging and discussing plan of care with patient as well as directly providing post-discharge instructions  Additional time then spent on discharge activities      Discharge Medications:  See after visit summary for reconciled discharge medications provided to patient and family        ** Please Note: This note has been constructed using a voice recognition system **

## 2020-01-25 NOTE — ASSESSMENT & PLAN NOTE
· Patient with a known history of COPD requiring 7 intubations previously  · Currently, completely normal lung exam with resolved wheezing  · Patient has been seen by pulmonology and will be discharged with the initiation of Trelogy and taper course of steroids

## 2020-01-25 NOTE — ASSESSMENT & PLAN NOTE
· History of ischemic CVA back in 12/2017, status post rehab  · Still has residual left-sided hemiparesis  · Continue with high-intensity statin, blood pressure control  · Will continue with PT and OT along with fall precaution  · Patient defers short-term rehabilitation at this time because she is going to move to [place] to live with her daughter

## 2020-01-27 ENCOUNTER — APPOINTMENT (OUTPATIENT)
Dept: LAB | Facility: HOSPITAL | Age: 70
End: 2020-01-27
Attending: FAMILY MEDICINE
Payer: COMMERCIAL

## 2020-01-27 DIAGNOSIS — R79.89 ELEVATED LFTS: ICD-10-CM

## 2020-01-27 LAB
ALBUMIN SERPL BCP-MCNC: 2.8 G/DL (ref 3.5–5)
ALP SERPL-CCNC: 223 U/L (ref 46–116)
ALT SERPL W P-5'-P-CCNC: 131 U/L (ref 12–78)
ANION GAP SERPL CALCULATED.3IONS-SCNC: 7 MMOL/L (ref 4–13)
AST SERPL W P-5'-P-CCNC: 71 U/L (ref 5–45)
BILIRUB SERPL-MCNC: 0.8 MG/DL (ref 0.2–1)
BUN SERPL-MCNC: 12 MG/DL (ref 5–25)
CALCIUM SERPL-MCNC: 8.3 MG/DL (ref 8.3–10.1)
CHLORIDE SERPL-SCNC: 106 MMOL/L (ref 100–108)
CO2 SERPL-SCNC: 30 MMOL/L (ref 21–32)
CREAT SERPL-MCNC: 0.66 MG/DL (ref 0.6–1.3)
GFR SERPL CREATININE-BSD FRML MDRD: 90 ML/MIN/1.73SQ M
GLUCOSE P FAST SERPL-MCNC: 86 MG/DL (ref 65–99)
POTASSIUM SERPL-SCNC: 3.1 MMOL/L (ref 3.5–5.3)
PROT SERPL-MCNC: 6.4 G/DL (ref 6.4–8.2)
SODIUM SERPL-SCNC: 143 MMOL/L (ref 136–145)

## 2020-01-27 PROCEDURE — 36415 COLL VENOUS BLD VENIPUNCTURE: CPT

## 2020-01-27 PROCEDURE — 80053 COMPREHEN METABOLIC PANEL: CPT

## 2020-01-27 NOTE — UTILIZATION REVIEW
Notification of Discharge  This is a Notification of Discharge from our facility [address]  Please be advised that this patient has been discharge from our facility  Below you will find the admission and discharge date and time including the patients disposition  PRESENTATION DATE: 1/18/2020 12:00 PM  OBS ADMISSION DATE:   IP ADMISSION DATE: 1/18/20 1446   DISCHARGE DATE: 1/24/2020  1:43 PM  DISPOSITION: Home/Self Care Home/Self Care   Admission Orders listed below:  Admission Orders (From admission, onward)     Ordered        01/18/20 1446  Inpatient Admission  Once                   Please contact the UR Department if additional information is required to close this patient's authorization/case  [facility] Utilization Review Department  Main: [phone] x carefully listen to the prompts  All voicemails are confidential   [email or web address]  Send all requests for admission clinical reviews, approved or denied determinations and any other requests to dedicated fax number below belonging to the campus where the patient is receiving treatment   List of dedicated fax numbers:  [address] DENIALS (Administrative/Medical Necessity) [phone]   [address] (Maternity/NICU/Pediatrics) [phone]   Danielito Hill [phone]   Marielos Chamberlain [phone]   Loraine Boston [phone]   Ji Dent [address] [phone]   [facility]  [phone]   [address] [facility]  [address] [phone]

## 2023-05-15 NOTE — DISCHARGE INSTR - ACTIVITY
Dressing techniques reviewed during hospital stay:   -To put on pants: using a long-handled reacher, grasp the front of the waist band and drape the pants open  Place your weaker leg inside the pant leg using the reacher to bring the waist band up to your knee  Once your leg is inside the correct pant leg, place your stronger leg into the other pant leg   -To put on an overhead gown or shirt: place your weaker arm inside the correct arm hole, bring the shirt over your neck and then thread your stronger arm through the correct arm hole  May tape wash cloth around left handle of walker to improve comfort and grasp 
richie